# Patient Record
Sex: FEMALE | Race: WHITE | NOT HISPANIC OR LATINO | Employment: OTHER | URBAN - METROPOLITAN AREA
[De-identification: names, ages, dates, MRNs, and addresses within clinical notes are randomized per-mention and may not be internally consistent; named-entity substitution may affect disease eponyms.]

---

## 2017-08-18 ENCOUNTER — TRANSFERRED RECORDS (OUTPATIENT)
Dept: HEALTH INFORMATION MANAGEMENT | Facility: CLINIC | Age: 72
End: 2017-08-18

## 2018-08-02 ENCOUNTER — RADIANT APPOINTMENT (OUTPATIENT)
Dept: MAMMOGRAPHY | Facility: CLINIC | Age: 73
End: 2018-08-02
Attending: OBSTETRICS & GYNECOLOGY
Payer: COMMERCIAL

## 2018-08-02 ENCOUNTER — OFFICE VISIT (OUTPATIENT)
Dept: OBGYN | Facility: CLINIC | Age: 73
End: 2018-08-02
Attending: OBSTETRICS & GYNECOLOGY
Payer: COMMERCIAL

## 2018-08-02 VITALS
SYSTOLIC BLOOD PRESSURE: 124 MMHG | HEIGHT: 65 IN | DIASTOLIC BLOOD PRESSURE: 62 MMHG | BODY MASS INDEX: 24.32 KG/M2 | WEIGHT: 146 LBS

## 2018-08-02 DIAGNOSIS — Z13.220 ENCOUNTER FOR LIPID SCREENING FOR CARDIOVASCULAR DISEASE: ICD-10-CM

## 2018-08-02 DIAGNOSIS — Z13.820 SCREENING FOR OSTEOPOROSIS: ICD-10-CM

## 2018-08-02 DIAGNOSIS — Z01.419 ENCOUNTER FOR GYNECOLOGICAL EXAMINATION WITHOUT ABNORMAL FINDING: Primary | ICD-10-CM

## 2018-08-02 DIAGNOSIS — Z12.31 VISIT FOR SCREENING MAMMOGRAM: ICD-10-CM

## 2018-08-02 DIAGNOSIS — Z13.6 ENCOUNTER FOR LIPID SCREENING FOR CARDIOVASCULAR DISEASE: ICD-10-CM

## 2018-08-02 DIAGNOSIS — Z13.1 SCREENING FOR DIABETES MELLITUS: ICD-10-CM

## 2018-08-02 LAB
CHOLEST SERPL-MCNC: 250 MG/DL
GLUCOSE BLD-MCNC: 95 MG/DL (ref 70–99)
HDLC SERPL-MCNC: 76 MG/DL
LDLC SERPL CALC-MCNC: 155 MG/DL
NONHDLC SERPL-MCNC: 174 MG/DL
TRIGL SERPL-MCNC: 94 MG/DL

## 2018-08-02 PROCEDURE — 36415 COLL VENOUS BLD VENIPUNCTURE: CPT | Performed by: OBSTETRICS & GYNECOLOGY

## 2018-08-02 PROCEDURE — 99397 PER PM REEVAL EST PAT 65+ YR: CPT | Performed by: OBSTETRICS & GYNECOLOGY

## 2018-08-02 PROCEDURE — 77063 BREAST TOMOSYNTHESIS BI: CPT | Mod: TC

## 2018-08-02 PROCEDURE — 77067 SCR MAMMO BI INCL CAD: CPT | Mod: TC

## 2018-08-02 PROCEDURE — 82947 ASSAY GLUCOSE BLOOD QUANT: CPT | Performed by: OBSTETRICS & GYNECOLOGY

## 2018-08-02 PROCEDURE — 80061 LIPID PANEL: CPT | Performed by: OBSTETRICS & GYNECOLOGY

## 2018-08-02 ASSESSMENT — ANXIETY QUESTIONNAIRES
3. WORRYING TOO MUCH ABOUT DIFFERENT THINGS: NOT AT ALL
2. NOT BEING ABLE TO STOP OR CONTROL WORRYING: NOT AT ALL
6. BECOMING EASILY ANNOYED OR IRRITABLE: NOT AT ALL
GAD7 TOTAL SCORE: 0
5. BEING SO RESTLESS THAT IT IS HARD TO SIT STILL: NOT AT ALL
1. FEELING NERVOUS, ANXIOUS, OR ON EDGE: NOT AT ALL
IF YOU CHECKED OFF ANY PROBLEMS ON THIS QUESTIONNAIRE, HOW DIFFICULT HAVE THESE PROBLEMS MADE IT FOR YOU TO DO YOUR WORK, TAKE CARE OF THINGS AT HOME, OR GET ALONG WITH OTHER PEOPLE: NOT DIFFICULT AT ALL
7. FEELING AFRAID AS IF SOMETHING AWFUL MIGHT HAPPEN: NOT AT ALL

## 2018-08-02 ASSESSMENT — PATIENT HEALTH QUESTIONNAIRE - PHQ9: 5. POOR APPETITE OR OVEREATING: NOT AT ALL

## 2018-08-02 NOTE — MR AVS SNAPSHOT
After Visit Summary   8/2/2018    Mima Barrett    MRN: [id number]           Patient Information     Date Of Birth          1945        Visit Information        Provider Department      8/2/2018 9:00 AM Kirstin Godoy DO [facility]        Today's Diagnoses     Encounter for gynecological examination without abnormal finding    -  1    Encounter for lipid screening for cardiovascular disease        Screening for diabetes mellitus        Screening for osteoporosis           Follow-ups after your visit        Follow-up notes from your care team     Return in about 1 year (around 8/2/2019).      Future tests that were ordered for you today     Open Future Orders        Priority Expected Expires Ordered    DX Hip/Pelvis/Spine Routine  8/2/2019 8/2/2018            Who to contact     If you have questions or need follow up information about today's clinic visit or your schedule please contact [facility] directly at [phone].  Normal or non-critical lab and imaging results will be communicated to you by MyChart, letter or phone within 4 business days after the clinic has received the results. If you do not hear from us within 7 days, please contact the clinic through [employer] or phone. If you have a critical or abnormal lab result, we will notify you by phone as soon as possible.  Submit refill requests through Segment or call your pharmacy and they will forward the refill request to us. Please allow 3 business days for your refill to be completed.          Additional Information About Your Visit        MyChart Information     Segment gives you secure access to your electronic health record. If you see a primary care provider, you can also send messages to your care team and make appointments. If you have questions, please call your primary care clinic.  If you do not have a primary care provider, please call [phone] and they will assist  "you.        Care EveryWhere ID     This is your Care EveryWhere ID. This could be used by other organizations to access your [place] medical records  [id number]        Your Vitals Were     Height Breastfeeding? BMI (Body Mass Index)             5' 4.75\" (1.645 m) No 24.48 kg/m2          Blood Pressure from Last 3 Encounters:   08/02/18 124/62   12/01/16 120/72   08/29/16 96/60    Weight from Last 3 Encounters:   08/02/18 146 lb (66.2 kg)   12/01/16 142 lb (64.4 kg)   08/29/16 149 lb 12.8 oz (67.9 kg)              We Performed the Following     Glucose, whole blood     Lipid panel reflex to direct LDL Fasting        Primary Care Provider Office Phone # Fax #    Damián Fowler [phone]       [facility] [address]  [facility] [address]        Equal Access to Services     [facility] : Hadii aad ku hadasho Soomaali, waaxda luqadaha, qaybta kaalmada adeegyada, waxay johnathanin haygersonn rock otoolen . So [facility] [phone].    ATENCIÓN: Si habla español, tiene a lopez disposición servicios gratuitos de asistencia lingüística. Mer al [phone].    We comply with applicable federal civil rights laws and [place] laws. We do not discriminate on the basis of race, color, national origin, age, disability, sex, sexual orientation, or gender identity.            Thank you!     Thank you for choosing [facility]  for your care. Our goal is always to provide you with excellent care. Hearing back from our patients is one way we can continue to improve our services. Please take a few minutes to complete the written survey that you may receive in the mail after your visit with us. Thank you!             Your Updated Medication List - Protect others around you: Learn how to safely use, store and throw away your medicines at www.disposemymeds.org.          This list is accurate as of 8/2/18  9:36 AM.  Always use your most recent med list.                   Brand Name Dispense " Instructions for use Diagnosis    ASPIRIN ADULT LOW STRENGTH PO      Take 81 mg by mouth daily.        calcium-vitamin D 600-400 MG-UNIT per tablet    CALTRATE     Take 1 tablet by mouth daily. Takes 2 tablets in am        GLUCOSAMINE SULFATE PO           MULTIVITAMIN & MINERAL PO      Take 1 tablet by mouth daily.

## 2018-08-02 NOTE — PROGRESS NOTES
Mima is a 73 year old  female who presents for annual exam.     Besides routine health maintenance, she has no other health concerns today .    Do you have a Health Care Directive?: Yes: Patient states has Advance Directive and will bring in a copy to clinic.    Fall risk:        HPI:  The patient's PCP is Damián Fowler MD.    Completed mammo. Last .     Recently had shingles.     Fasting for labs this year. Cholesterol last checked with GP in the winter.   Was on simvistatin until Dec 2017.     No vb/discharge.     Was having issues with her right hip. Then started taking glucosamine and this helped.   Has been doing exercises.   Last dexa .     GYNECOLOGIC HISTORY:  No LMP recorded. Patient is postmenopausal..   reports that she has never smoked. She has never used smokeless tobacco.    Patient is sexually active.  STD testing offered?  Declined  Last PHQ-9 score on record=   PHQ-9 SCORE 2018   Total Score 1     Last GAD7 score on record=   DAVID-7 SCORE 2016   Total Score 0 0     Alcohol Score = 4    HEALTH MAINTENANCE:  Cholesterol:   Cholesterol   Date Value Ref Range Status   2016 227 (H) <200 mg/dL Final     Comment:     Desirable:       <200 mg/dl   2015 189 125 - 200 mg/dL Final    16   Total= 227, Triglycerides=80, HDL=71, NPE=328, FBS=98, TSH=1.79  Last Mammo: 2016, Result: normal, Next Mammo: today   Pap: (No results found for: PAP )  DEXA:  16  Colonoscopy:  14, Result:  normal, Next Colonoscopy: NA.    Health maintenance updated:  yes    HISTORY:  Obstetric History       T2      L2     SAB0   TAB0   Ectopic0   Multiple0   Live Births2       # Outcome Date GA Lbr Juan Pablo/2nd Weight Sex Delivery Anes PTL Lv   2 Term         MANOHAR   1 Term         MANOHAR        Patient Active Problem List   Diagnosis     Hyperlipidemia LDL goal <130     Past Surgical History:   Procedure Laterality Date     cryotherapy      No AIDAN 2/3 since      "ENDOMETRIAL SAMPLING (BIOPSY)  4/97;11/98;4/00    '97-Prolif endo;'98-prolif endo;4/00-prolif endo     OTHER SURGICAL HISTORY  1977    REMOVAL NODULES ON VOCAL CORDS      Social History   Substance Use Topics     Smoking status: Never Smoker     Smokeless tobacco: Never Used     Alcohol use 0.0 oz/week     0 Standard drinks or equivalent per week      Comment: 3 times/wk      Problem (# of Occurrences) Relation (Name,Age of Onset)    Colon Polyps (1) Father    Fractures (1) Mother (83): hip; had previously had radiation therapy for uterine cancer    Osteoperosis (2) Mother, Maternal Grandmother    Uterine Cancer (1) Mother            Current Outpatient Prescriptions   Medication Sig     ASPIRIN ADULT LOW STRENGTH PO Take 81 mg by mouth daily.     calcium-vitamin D (CALTRATE) 600-400 MG-UNIT per tablet Take 1 tablet by mouth daily. Takes 2 tablets in am      GLUCOSAMINE SULFATE PO      Multiple Vitamins-Minerals (MULTIVITAMIN & MINERAL PO) Take 1 tablet by mouth daily.     No current facility-administered medications for this visit.        No Known Allergies    Past medical, surgical, social and family history were reviewed and updated in EPIC.    ROS:   Genitourinary: Urgency  Musculoskeletal: Joint Pain  Endocrine: Decreased Libido    EXAM:  /62  Ht 5' 4.75\" (1.645 m)  Wt 146 lb (66.2 kg)  Breastfeeding? No  BMI 24.48 kg/m2   BMI: Body mass index is 24.48 kg/(m^2).    EXAM:  Constitutional: Appearance: Well nourished, well developed alert, in no acute distress  Neck:  Lymph Nodes:  No lymphadenopathy present    Thyroid:  Gland size normal, nontender, no nodules or masses present  on palpation  Chest:  Respiratory Effort:  Breathing unlabored  Cardiovascular:Heart    Auscultation:  Regular rate, normal rhythm, no murmurs present  Breasts: Inspection of Breasts:  No lymphadenopathy present., Palpation of Breasts and Axillae:  No masses present on palpation, no breast tenderness., Axillary Lymph Nodes:  No " lymphadenopathy present. and No nodularity, asymmetry or nipple discharge bilaterally.  Gastrointestinal:  Abdominal Examination:  Abdomen nontender to palpation, tone normal without     rigidity or guarding, no masses present, umbilicus without lesions    Liver and speen:  No hepatomegaly present, liver nontender to palpation    Hernias:  No hernias present  Lymphatic: Lymph Nodes:  No other lymphadenopathy present  Skin:  General Inspection:  No rashes present, no lesions present, no areas of  discoloration.    Genitalia and Groin:  No rashes present, no lesions present, no areas of  discoloration, no masses present  Neurologic/Psychiatric:    Mental Status:  Oriented X3     Pelvic Exam:  External Genitalia:     Normal appearance for age, no discharge present, no tenderness present, no inflammatory lesions present, color normal  Vagina:     Normal vaginal vault without central or paravaginal defects, no discharge present, no inflammatory lesions present, no masses present  Bladder:     Nontender to palpation  Urethra:   Urethral Body:  Urethra palpation normal, urethra structural support normal   Urethral Meatus:  No erythema or lesions present  Cervix:     Appearance healthy, no lesions present, nontender to palpation, no bleeding present  Uterus:     Uterus: firm, normal sized and nontender, anteverted in position.   Adnexa:     No adnexal tenderness present, no adnexal masses present  Perineum:     Perineum within normal limits, no evidence of trauma, no rashes or skin lesions present  Anus:     Anus within normal limits, no hemorrhoids present  Inguinal Lymph Nodes:     No lymphadenopathy present  Pubic Hair:     Normal pubic hair distribution for age  Genitalia and Groin:     No rashes present, no lesions present, no areas of discoloration, no masses present      COUNSELING:   Reviewed preventive health counseling, as reflected in patient instructions       Osteoporosis Prevention/Bone Health    BMI:  Body  mass index is 24.48 kg/(m^2).     reports that she has never smoked. She has never used smokeless tobacco.      ASSESSMENT:  73 year old female with satisfactory annual exam.    ICD-10-CM    1. Encounter for gynecological examination without abnormal finding Z01.419    2. Encounter for lipid screening for cardiovascular disease Z13.220 Lipid panel reflex to direct LDL Fasting    Z13.6    3. Screening for diabetes mellitus Z13.1 Glucose, whole blood   4. Screening for osteoporosis Z13.820 DX Hip/Pelvis/Spine       PLAN:  -UTD for cervical cancer screening. No further paps needed  -Breast self awareness discussed. UTD for mammogram.  -Discussed exercise-making plan, strength training. Nutrition encouraged.  -Colonoscopy due next year  -Osteoporosis prevention discussed. Due for dexa  -Desires fasting labs. If elevated cholesterol this year, will have her f/u with PCP, may need to resume therapy  -PMB precautions  -Return one year for next annual exam      Kirstin Bower Masters, DO

## 2018-08-03 ASSESSMENT — PATIENT HEALTH QUESTIONNAIRE - PHQ9: SUM OF ALL RESPONSES TO PHQ QUESTIONS 1-9: 1

## 2018-08-03 ASSESSMENT — ANXIETY QUESTIONNAIRES: GAD7 TOTAL SCORE: 0

## 2018-08-07 ENCOUNTER — RADIANT APPOINTMENT (OUTPATIENT)
Dept: BONE DENSITY | Facility: CLINIC | Age: 73
End: 2018-08-07
Attending: OBSTETRICS & GYNECOLOGY
Payer: COMMERCIAL

## 2018-08-07 DIAGNOSIS — Z13.820 SCREENING FOR OSTEOPOROSIS: ICD-10-CM

## 2018-08-07 PROCEDURE — 77080 DXA BONE DENSITY AXIAL: CPT | Performed by: OBSTETRICS & GYNECOLOGY

## 2018-09-06 ENCOUNTER — OFFICE VISIT (OUTPATIENT)
Dept: OBGYN | Facility: CLINIC | Age: 73
End: 2018-09-06
Payer: COMMERCIAL

## 2018-09-06 VITALS — DIASTOLIC BLOOD PRESSURE: 64 MMHG | WEIGHT: 151 LBS | SYSTOLIC BLOOD PRESSURE: 106 MMHG | BODY MASS INDEX: 25.32 KG/M2

## 2018-09-06 DIAGNOSIS — M81.0 OSTEOPOROSIS, SENILE: Primary | ICD-10-CM

## 2018-09-06 PROCEDURE — 36415 COLL VENOUS BLD VENIPUNCTURE: CPT | Performed by: OBSTETRICS & GYNECOLOGY

## 2018-09-06 PROCEDURE — 80048 BASIC METABOLIC PNL TOTAL CA: CPT | Performed by: OBSTETRICS & GYNECOLOGY

## 2018-09-06 PROCEDURE — 99214 OFFICE O/P EST MOD 30 MIN: CPT | Performed by: OBSTETRICS & GYNECOLOGY

## 2018-09-06 PROCEDURE — 82306 VITAMIN D 25 HYDROXY: CPT | Performed by: OBSTETRICS & GYNECOLOGY

## 2018-09-06 RX ORDER — ALENDRONATE SODIUM 70 MG/1
TABLET ORAL
Qty: 12 TABLET | Refills: 3 | Status: SHIPPED | OUTPATIENT
Start: 2018-09-06 | End: 2019-08-08

## 2018-09-06 NOTE — PROGRESS NOTES
SUBJECTIVE:                                                   Mima Barrett is a 73 year old female who presents to clinic today for the following health issue(s):  Patient presents with:  Consult: Dexa Results      HPI:  Is really interested in starting a medicine. Does not want to get any fractures. Saw her mom die in surgery after breaking her hip. Her grandmother had a bad outcome as well from osteoporosis.    Walks 30m daily. Takes 2 calcium tabs per day. Eats cheese daily. [place] milk daily. Protein shake daily that has ca.     No hx GERD.     Years ago tried a med maybe and didn't feel well on it.     Review of PMH, SocHx, SurHx, FHx, medications completed. Epic updated.    No LMP recorded. Patient is postmenopausal..   Patient is sexually active, .  Using menopause for contraception.    reports that she has never smoked. She has never used smokeless tobacco.    STD testing offered?  Declined    Health maintenance updated:  yes    Today's PHQ-2 Score:   PHQ-2 (  [employer]) 2016   Q1: Little interest or pleasure in doing things 0   Q2: Feeling down, depressed or hopeless 0   PHQ-2 Score 0     Today's PHQ-9 Score:   PHQ-9 SCORE 2018   Total Score 1     Today's DAVID-7 Score:   DAVID-7 SCORE 2018   Total Score 0       Problem list and histories reviewed & adjusted, as indicated.  Additional history: as documented.    Patient Active Problem List   Diagnosis     Hyperlipidemia LDL goal <130     Past Surgical History:   Procedure Laterality Date     cryotherapy      No AIDAN 2/3 since     ENDOMETRIAL SAMPLING (BIOPSY)  ;;-Prolif endo;-prolif endo;-prolif endo     OTHER SURGICAL HISTORY      REMOVAL NODULES ON VOCAL CORDS      Social History   Substance Use Topics     Smoking status: Never Smoker     Smokeless tobacco: Never Used     Alcohol use 0.0 oz/week     0 Standard drinks or equivalent per week      Comment: 3 times/wk      Problem (# of Occurrences)  Relation (Name,Age of Onset)    Colon Polyps (1) Father    Fractures (1) Mother (83): hip; had previously had radiation therapy for uterine cancer    Osteoporosis (2) Mother, Maternal Grandmother    Uterine Cancer (1) Mother            Current Outpatient Prescriptions   Medication Sig     alendronate (FOSAMAX) 70 MG tablet Take 1 tablet (70 mg) by mouth with 8oz water every 7 days 30 minutes before breakfast and remain upright during this time.     ASPIRIN ADULT LOW STRENGTH PO Take 81 mg by mouth daily.     calcium-vitamin D (CALTRATE) 600-400 MG-UNIT per tablet Take 1 tablet by mouth daily. Takes 2 tablets in am      GLUCOSAMINE SULFATE PO      Multiple Vitamins-Minerals (MULTIVITAMIN & MINERAL PO) Take 1 tablet by mouth daily.     No current facility-administered medications for this visit.      No Known Allergies    ROS:  12 point review of systems negative other than symptoms noted below.    OBJECTIVE:     /64  Wt 151 lb (68.5 kg)  Breastfeeding? No  BMI 25.32 kg/m2  Body mass index is 25.32 kg/(m^2).    Exam:  Constitutional:  Appearance: Well nourished, well developed alert, in no acute distress  Chest:  Respiratory Effort:  Breathing unlabored  Neurologic/Psychiatric:  Mental Status:  Oriented X3        ASSESSMENT/PLAN:                                                        ICD-10-CM    1. Osteoporosis, senile M81.0 Basic metabolic panel     Vitamin D Deficiency     alendronate (FOSAMAX) 70 MG tablet       Patient Instructions     Alendronate weekly tablets  Brand Name: Fosamax  What is this medicine?  ALENDRONATE (a TIM droe louis) slows calcium loss from bones. It helps to make healthy bone and to slow bone loss in people with osteoporosis. It may be used to treat Paget's disease.  How should I use this medicine?  You must take this medicine exactly as directed or you will lower the amount of medicine you absorb into your body or you may cause yourself harm. Take your dose by mouth first thing in  the morning, after you are up for the day. Do not eat or drink anything before you take this medicine. Swallow your medicine with a full glass (6 to 8 fluid ounces) of plain water. Do not take this tablet with any other drink. Do not chew or crush the tablet. After taking this medicine, do not eat breakfast, drink, or take any medicines or vitamins for at least 30 minutes. Stand or sit up for at least 30 minutes after you take this medicine; do not lie down. Take this medicine on the same day every week. Do not take your medicine more often than directed.  Talk to your pediatrician regarding the use of this medicine in children. Special care may be needed.  What side effects may I notice from receiving this medicine?  Side effects that you should report to your doctor or health care professional as soon as possible:    allergic reactions like skin rash, itching or hives, swelling of the face, lips, or tongue    black or tarry stools    bone, muscle or joint pain    changes in vision    chest pain    heartburn or stomach pain    jaw pain, especially after dental work    pain or trouble when swallowing    redness, blistering, peeling or loosening of the skin, including inside the mouth  Side effects that usually do not require medical attention (report to your doctor or health care professional if they continue or are bothersome):    changes in taste    diarrhea or constipation    eye pain or itching    headache    nausea or vomiting    stomach gas or fullness  What may interact with this medicine?    aluminum hydroxide    antacids    aspirin    calcium supplements    drugs for inflammation like ibuprofen, naproxen, and others    iron supplements    magnesium supplements    vitamins with minerals    What if I miss a dose?  If you miss a dose, take the dose on the morning after you remember. Then take your next dose on your regular day of the week. Never take 2 tablets on the same day. Do not take double or extra  doses.  Where should I keep my medicine?  Keep out of the reach of children.  Store at room temperature of 15 and 30 degrees C (59 and 86 degrees F). Throw away any unused medicine after the expiration date.  What should I tell my health care provider before I take this medicine?  They need to know if you have any of these conditions:    esophagus, stomach, or intestine problems, like acid-reflux or GERD    dental disease    kidney disease    low blood calcium    low vitamin D    problems swallowing    problems sitting or standing for 30 minutes    an unusual or allergic reaction to alendronate, other medicines, foods, dyes, or preservatives    pregnant or trying to get pregnant    breast-feeding  What should I watch for while using this medicine?  Visit your doctor or health care professional for regular checks ups. It may be some time before you see benefit from this medicine. Do not stop taking your medication except on your doctor's advice. Your doctor or health care professional may order blood tests and other tests to see how you are doing.  You should make sure you get enough calcium and vitamin D while you are taking this medicine, unless your doctor tells you not to. Discuss the foods you eat and the vitamins you take with your health care professional.  Some people who take this medicine have severe bone, joint, and/or muscle pain. This medicine may also increase your risk for a broken thigh bone. Tell your doctor right away if you have pain in your upper leg or groin. Tell your doctor if you have any pain that does not go away or that gets worse.  This medicine can make you more sensitive to the sun. If you get a rash while taking this medicine, sunlight may cause the rash to get worse. Keep out of the sun. If you cannot avoid being in the sun, wear protective clothing and use sunscreen. Do not use sun lamps or tanning beds/booths.  NOTE:This sheet is a summary. It may not cover all possible information.  If you have questions about this medicine, talk to your doctor, pharmacist, or health care provider. Copyright  2018 [employer]            -Discussed how bone density measured and then risk for fracture determined, when to decide upon need for meds and Frax scores. She would qualify for tx based on frax. Pt is definitely in agreement. Discussed the morbidity and mortality of hip fractures and other fractures in elderly.   Reviewed her hx, no contraindications for bisphosphonates. Discussed risks and benefits, possible SE and when to call back/return if any issues. Discussed monitoring therapy with Dexa in 2yr. Discussed how to take meds and stay up right.   Check vit D and ca prior to initiating therapy.  Questions answered. Pt happy with plan.     (25 minutes was spent face to face with the patient today discussing her history, diagnosis, and follow-up plan as noted above. Over 50% of the visit was spent in counseling and coordination of care.)      Kirstin Bower Masters, [facility] FOR [facility]

## 2018-09-06 NOTE — PATIENT INSTRUCTIONS
Alendronate weekly tablets  Brand Name: Fosamax  What is this medicine?  ALENDRONATE (a TIM droe louis) slows calcium loss from bones. It helps to make healthy bone and to slow bone loss in people with osteoporosis. It may be used to treat Paget's disease.  How should I use this medicine?  You must take this medicine exactly as directed or you will lower the amount of medicine you absorb into your body or you may cause yourself harm. Take your dose by mouth first thing in the morning, after you are up for the day. Do not eat or drink anything before you take this medicine. Swallow your medicine with a full glass (6 to 8 fluid ounces) of plain water. Do not take this tablet with any other drink. Do not chew or crush the tablet. After taking this medicine, do not eat breakfast, drink, or take any medicines or vitamins for at least 30 minutes. Stand or sit up for at least 30 minutes after you take this medicine; do not lie down. Take this medicine on the same day every week. Do not take your medicine more often than directed.  Talk to your pediatrician regarding the use of this medicine in children. Special care may be needed.  What side effects may I notice from receiving this medicine?  Side effects that you should report to your doctor or health care professional as soon as possible:    allergic reactions like skin rash, itching or hives, swelling of the face, lips, or tongue    black or tarry stools    bone, muscle or joint pain    changes in vision    chest pain    heartburn or stomach pain    jaw pain, especially after dental work    pain or trouble when swallowing    redness, blistering, peeling or loosening of the skin, including inside the mouth  Side effects that usually do not require medical attention (report to your doctor or health care professional if they continue or are bothersome):    changes in taste    diarrhea or constipation    eye pain or itching    headache    nausea or vomiting    stomach gas  or fullness  What may interact with this medicine?    aluminum hydroxide    antacids    aspirin    calcium supplements    drugs for inflammation like ibuprofen, naproxen, and others    iron supplements    magnesium supplements    vitamins with minerals    What if I miss a dose?  If you miss a dose, take the dose on the morning after you remember. Then take your next dose on your regular day of the week. Never take 2 tablets on the same day. Do not take double or extra doses.  Where should I keep my medicine?  Keep out of the reach of children.  Store at room temperature of 15 and 30 degrees C (59 and 86 degrees F). Throw away any unused medicine after the expiration date.  What should I tell my health care provider before I take this medicine?  They need to know if you have any of these conditions:    esophagus, stomach, or intestine problems, like acid-reflux or GERD    dental disease    kidney disease    low blood calcium    low vitamin D    problems swallowing    problems sitting or standing for 30 minutes    an unusual or allergic reaction to alendronate, other medicines, foods, dyes, or preservatives    pregnant or trying to get pregnant    breast-feeding  What should I watch for while using this medicine?  Visit your doctor or health care professional for regular checks ups. It may be some time before you see benefit from this medicine. Do not stop taking your medication except on your doctor's advice. Your doctor or health care professional may order blood tests and other tests to see how you are doing.  You should make sure you get enough calcium and vitamin D while you are taking this medicine, unless your doctor tells you not to. Discuss the foods you eat and the vitamins you take with your health care professional.  Some people who take this medicine have severe bone, joint, and/or muscle pain. This medicine may also increase your risk for a broken thigh bone. Tell your doctor right away if you have pain  in your upper leg or groin. Tell your doctor if you have any pain that does not go away or that gets worse.  This medicine can make you more sensitive to the sun. If you get a rash while taking this medicine, sunlight may cause the rash to get worse. Keep out of the sun. If you cannot avoid being in the sun, wear protective clothing and use sunscreen. Do not use sun lamps or tanning beds/booths.  NOTE:This sheet is a summary. It may not cover all possible information. If you have questions about this medicine, talk to your doctor, pharmacist, or health care provider. Copyright  2018 [employer]

## 2018-09-06 NOTE — MR AVS SNAPSHOT
After Visit Summary   9/6/2018    Mima Barrett    MRN: [id number]           Patient Information     Date Of Birth          1945        Visit Information        Provider Department      9/6/2018 1:50 PM sKirstin DO [facility]        Today's Diagnoses     Osteoporosis, senile    -  1      Care Instructions      Alendronate weekly tablets  Brand Name: Fosamax  What is this medicine?  ALENDRONATE (a TIM droe louis) slows calcium loss from bones. It helps to make healthy bone and to slow bone loss in people with osteoporosis. It may be used to treat Paget's disease.  How should I use this medicine?  You must take this medicine exactly as directed or you will lower the amount of medicine you absorb into your body or you may cause yourself harm. Take your dose by mouth first thing in the morning, after you are up for the day. Do not eat or drink anything before you take this medicine. Swallow your medicine with a full glass (6 to 8 fluid ounces) of plain water. Do not take this tablet with any other drink. Do not chew or crush the tablet. After taking this medicine, do not eat breakfast, drink, or take any medicines or vitamins for at least 30 minutes. Stand or sit up for at least 30 minutes after you take this medicine; do not lie down. Take this medicine on the same day every week. Do not take your medicine more often than directed.  Talk to your pediatrician regarding the use of this medicine in children. Special care may be needed.  What side effects may I notice from receiving this medicine?  Side effects that you should report to your doctor or health care professional as soon as possible:    allergic reactions like skin rash, itching or hives, swelling of the face, lips, or tongue    black or tarry stools    bone, muscle or joint pain    changes in vision    chest pain    heartburn or stomach pain    jaw pain, especially after dental work    pain or trouble  when swallowing    redness, blistering, peeling or loosening of the skin, including inside the mouth  Side effects that usually do not require medical attention (report to your doctor or health care professional if they continue or are bothersome):    changes in taste    diarrhea or constipation    eye pain or itching    headache    nausea or vomiting    stomach gas or fullness  What may interact with this medicine?    aluminum hydroxide    antacids    aspirin    calcium supplements    drugs for inflammation like ibuprofen, naproxen, and others    iron supplements    magnesium supplements    vitamins with minerals    What if I miss a dose?  If you miss a dose, take the dose on the morning after you remember. Then take your next dose on your regular day of the week. Never take 2 tablets on the same day. Do not take double or extra doses.  Where should I keep my medicine?  Keep out of the reach of children.  Store at room temperature of 15 and 30 degrees C (59 and 86 degrees F). Throw away any unused medicine after the expiration date.  What should I tell my health care provider before I take this medicine?  They need to know if you have any of these conditions:    esophagus, stomach, or intestine problems, like acid-reflux or GERD    dental disease    kidney disease    low blood calcium    low vitamin D    problems swallowing    problems sitting or standing for 30 minutes    an unusual or allergic reaction to alendronate, other medicines, foods, dyes, or preservatives    pregnant or trying to get pregnant    breast-feeding  What should I watch for while using this medicine?  Visit your doctor or health care professional for regular checks ups. It may be some time before you see benefit from this medicine. Do not stop taking your medication except on your doctor's advice. Your doctor or health care professional may order blood tests and other tests to see how you are doing.  You should make sure you get enough calcium  and vitamin D while you are taking this medicine, unless your doctor tells you not to. Discuss the foods you eat and the vitamins you take with your health care professional.  Some people who take this medicine have severe bone, joint, and/or muscle pain. This medicine may also increase your risk for a broken thigh bone. Tell your doctor right away if you have pain in your upper leg or groin. Tell your doctor if you have any pain that does not go away or that gets worse.  This medicine can make you more sensitive to the sun. If you get a rash while taking this medicine, sunlight may cause the rash to get worse. Keep out of the sun. If you cannot avoid being in the sun, wear protective clothing and use sunscreen. Do not use sun lamps or tanning beds/booths.  NOTE:This sheet is a summary. It may not cover all possible information. If you have questions about this medicine, talk to your doctor, pharmacist, or health care provider. Copyright  2018 [employer]                Follow-ups after your visit        Who to contact     If you have questions or need follow up information about today's clinic visit or your schedule please contact [facility] directly at [phone].  Normal or non-critical lab and imaging results will be communicated to you by [employer], letter or phone within 4 business days after the clinic has received the results. If you do not hear from us within 7 days, please contact the clinic through [employer] or phone. If you have a critical or abnormal lab result, we will notify you by phone as soon as possible.  Submit refill requests through [employer] or call your pharmacy and they will forward the refill request to us. Please allow 3 business days for your refill to be completed.          Additional Information About Your Visit        [employer] Information     [employer] gives you secure access to your electronic health record. If you see a primary care provider, you can also send messages to  your care team and make appointments. If you have questions, please call your primary care clinic.  If you do not have a primary care provider, please call [phone] and they will assist you.        Care EveryWhere ID     This is your Care EveryWhere ID. This could be used by other organizations to access your [place] medical records  [id number]        Your Vitals Were     Breastfeeding? BMI (Body Mass Index)                No 25.32 kg/m2           Blood Pressure from Last 3 Encounters:   09/06/18 106/64   08/02/18 124/62   12/01/16 120/72    Weight from Last 3 Encounters:   09/06/18 151 lb (68.5 kg)   08/02/18 146 lb (66.2 kg)   12/01/16 142 lb (64.4 kg)              We Performed the Following     Basic metabolic panel     Vitamin D Deficiency          Today's Medication Changes          These changes are accurate as of 9/6/18  2:42 PM.  If you have any questions, ask your nurse or doctor.               Start taking these medicines.        Dose/Directions    alendronate 70 MG tablet   Commonly known as:  FOSAMAX   Used for:  Osteoporosis, senile   Started by:  Kirstin Godoy, DO        Take 1 tablet (70 mg) by mouth with 8oz water every 7 days 30 minutes before breakfast and remain upright during this time.   Quantity:  12 tablet   Refills:  3            Where to get your medicines      These medications were sent to [facility] [address] [place] [address] [address] [facility]  JESSICA  [facility] [address]     Phone:  [phone]     alendronate 70 MG tablet                Primary Care Provider Office Phone # Fax #    Damián Fowler [phone]       [facility] [address]  [facility] [address]        Equal Access to Services     SAGE GOULD AH: Pasha Rowe, darrell del cid, julianna melgar. So [facility] [phone].    ATENCIÓN: Si habla español, tiene a lopez disposición  servicios gratuitos de asistencia lingüística. Mer younger [phone].    We comply with applicable federal civil rights laws and [place] laws. We do not discriminate on the basis of race, color, national origin, age, disability, sex, sexual orientation, or gender identity.            Thank you!     Thank you for choosing [facility]  for your care. Our goal is always to provide you with excellent care. Hearing back from our patients is one way we can continue to improve our services. Please take a few minutes to complete the written survey that you may receive in the mail after your visit with us. Thank you!             Your Updated Medication List - Protect others around you: Learn how to safely use, store and throw away your medicines at www.disposemymeds.org.          This list is accurate as of 9/6/18  2:42 PM.  Always use your most recent med list.                   Brand Name Dispense Instructions for use Diagnosis    alendronate 70 MG tablet    FOSAMAX    12 tablet    Take 1 tablet (70 mg) by mouth with 8oz water every 7 days 30 minutes before breakfast and remain upright during this time.    Osteoporosis, senile       ASPIRIN ADULT LOW STRENGTH PO      Take 81 mg by mouth daily.        calcium carbonate 600 mg-vitamin D 400 units 600-400 MG-UNIT per tablet    CALTRATE     Take 1 tablet by mouth daily. Takes 2 tablets in am        GLUCOSAMINE SULFATE PO           MULTIVITAMIN & MINERAL PO      Take 1 tablet by mouth daily.

## 2018-09-07 LAB
ANION GAP SERPL CALCULATED.3IONS-SCNC: 9 MMOL/L (ref 3–14)
BUN SERPL-MCNC: 15 MG/DL (ref 7–30)
CALCIUM SERPL-MCNC: 9.4 MG/DL (ref 8.5–10.1)
CHLORIDE SERPL-SCNC: 106 MMOL/L (ref 94–109)
CO2 SERPL-SCNC: 28 MMOL/L (ref 20–32)
CREAT SERPL-MCNC: 0.72 MG/DL (ref 0.52–1.04)
DEPRECATED CALCIDIOL+CALCIFEROL SERPL-MC: 40 UG/L (ref 20–75)
GFR SERPL CREATININE-BSD FRML MDRD: 79 ML/MIN/1.7M2
GLUCOSE SERPL-MCNC: 86 MG/DL (ref 70–99)
POTASSIUM SERPL-SCNC: 4.5 MMOL/L (ref 3.4–5.3)
SODIUM SERPL-SCNC: 143 MMOL/L (ref 133–144)

## 2018-11-27 ENCOUNTER — TELEPHONE (OUTPATIENT)
Dept: NURSING | Facility: CLINIC | Age: 73
End: 2018-11-27

## 2018-11-27 NOTE — TELEPHONE ENCOUNTER
Pt calling inquring on possible side effects from the alendronate (Fosamax); started medication in September 2018  Experiencing stomach upset but having more esophageal burning pain that has slowly increased over the last two weeks that is her main complaint. Is similar to indigestion pain.  Has been taking medication in morning 30 min before BF and staying upright every Monday. Avoids Ibuprofen. Cannot take pepto-bismol with this medication.    Requesting Dr. Godoy to advise on medication as she feels like she cannot take anymore with these side effects.    Routing to Dr. Godoy to advise and will call pt back with her response.  Pt verbalized understanding and no further questions.

## 2018-12-07 NOTE — TELEPHONE ENCOUNTER
Spoke with Mima. She had taken the med for 2mo and got pain/refulx. Stopped it for couple weeks. Then last week restarted and did fine with the first one. Has also noticed other foods/drinks which cause reflux and has limited these.   Will continue oral fosamax and see how she does. If reflux/intolerance returns will change to zolendronic acid infusion.   Pt happy with plan.    Kirstin Bower Masters, DO

## 2019-07-31 ENCOUNTER — TRANSFERRED RECORDS (OUTPATIENT)
Dept: HEALTH INFORMATION MANAGEMENT | Facility: CLINIC | Age: 74
End: 2019-07-31

## 2019-08-08 ENCOUNTER — OFFICE VISIT (OUTPATIENT)
Dept: OBGYN | Facility: CLINIC | Age: 74
End: 2019-08-08
Payer: COMMERCIAL

## 2019-08-08 ENCOUNTER — ANCILLARY PROCEDURE (OUTPATIENT)
Dept: MAMMOGRAPHY | Facility: CLINIC | Age: 74
End: 2019-08-08
Payer: COMMERCIAL

## 2019-08-08 VITALS
HEIGHT: 65 IN | BODY MASS INDEX: 23.16 KG/M2 | WEIGHT: 139 LBS | SYSTOLIC BLOOD PRESSURE: 92 MMHG | DIASTOLIC BLOOD PRESSURE: 58 MMHG | HEART RATE: 64 BPM

## 2019-08-08 DIAGNOSIS — M81.0 OSTEOPOROSIS, SENILE: ICD-10-CM

## 2019-08-08 DIAGNOSIS — Z01.419 ENCOUNTER FOR GYNECOLOGICAL EXAMINATION WITHOUT ABNORMAL FINDING: Primary | ICD-10-CM

## 2019-08-08 DIAGNOSIS — Z12.31 VISIT FOR SCREENING MAMMOGRAM: ICD-10-CM

## 2019-08-08 PROCEDURE — 77067 SCR MAMMO BI INCL CAD: CPT | Mod: TC

## 2019-08-08 PROCEDURE — 77063 BREAST TOMOSYNTHESIS BI: CPT | Mod: TC

## 2019-08-08 PROCEDURE — 99397 PER PM REEVAL EST PAT 65+ YR: CPT | Performed by: OBSTETRICS & GYNECOLOGY

## 2019-08-08 RX ORDER — ALENDRONATE SODIUM 70 MG/1
TABLET ORAL
Qty: 12 TABLET | Refills: 3 | Status: SHIPPED | OUTPATIENT
Start: 2019-08-08 | End: 2020-05-04

## 2019-08-08 ASSESSMENT — ANXIETY QUESTIONNAIRES
7. FEELING AFRAID AS IF SOMETHING AWFUL MIGHT HAPPEN: NOT AT ALL
GAD7 TOTAL SCORE: 0
5. BEING SO RESTLESS THAT IT IS HARD TO SIT STILL: NOT AT ALL
1. FEELING NERVOUS, ANXIOUS, OR ON EDGE: NOT AT ALL
3. WORRYING TOO MUCH ABOUT DIFFERENT THINGS: NOT AT ALL
2. NOT BEING ABLE TO STOP OR CONTROL WORRYING: NOT AT ALL
6. BECOMING EASILY ANNOYED OR IRRITABLE: NOT AT ALL

## 2019-08-08 ASSESSMENT — PATIENT HEALTH QUESTIONNAIRE - PHQ9
5. POOR APPETITE OR OVEREATING: NOT AT ALL
SUM OF ALL RESPONSES TO PHQ QUESTIONS 1-9: 0

## 2019-08-08 ASSESSMENT — MIFFLIN-ST. JEOR: SCORE: 1141.38

## 2019-08-08 NOTE — PROGRESS NOTES
Mima is a 74 year old  female who presents for annual exam.     Besides routine health maintenance,  she would like to discuss bladder control, and muscle cramps.    Do you have a Health Care Directive?: Yes: Patient states has Advance Directive and will bring in a copy to clinic.    Fall risk:   Fallen 2 or more times in the past year?: No  Any fall with injury in the past year?: No    HPI:  The patient's PCP is  Damián Fowler MD.  Had annual recently. Had labs done.     Found out in  had bursitis, had cortisone shots and PT and has really helped.     Had lost wt.     Colonoscopy last week. Doing them q 5yrs. Polyps found, next in 3yrs.     Tolerating the fosamax.     Had traveled to [place] in spring.     No VB.         GYNECOLOGIC HISTORY:  No LMP recorded. Patient is postmenopausal..   reports that she has never smoked. She has never used smokeless tobacco.    Patient is sexually active.  STD testing offered?  Declined  Last PHQ-9 score on record=   PHQ-9 SCORE 2019   PHQ-9 Total Score 0     Last GAD7 score on record=   DAVID-7 SCORE 2016   Total Score 0 0 0     Alcohol Score = 3    HEALTH MAINTENANCE:  Cholesterol: (  Cholesterol   Date Value Ref Range Status   2018 250 (H) <200 mg/dL Final     Comment:     Desirable:       <200 mg/dl   2016 227 (H) <200 mg/dL Final     Comment:     Desirable:       <200 mg/dl      Last Mammo: one year ago, Result: normal, Next Mammo: today   Pap:   DEXA:  18  Colonoscopy:  19, Result:  Polyp, Next Colonoscopy: 3 years.    Health maintenance updated:  yes    HISTORY:  OB History    Para Term  AB Living   2 2 2 0 0 2   SAB TAB Ectopic Multiple Live Births   0 0 0 0 2      # Outcome Date GA Lbr Juan Pablo/2nd Weight Sex Delivery Anes PTL Lv   2 Term         MANOHAR   1 Term         MANOHAR     Patient Active Problem List   Diagnosis     Hyperlipidemia LDL goal <130     Past Surgical History:   Procedure  "Laterality Date     cryotherapy  1978    No AIDAN 2/3 since     ENDOMETRIAL SAMPLING (BIOPSY)  4/97;11/98;4/00 '97-Prolif endo;'98-prolif endo;4/00-prolif endo     OTHER SURGICAL HISTORY  1977    REMOVAL NODULES ON VOCAL CORDS      Social History     Tobacco Use     Smoking status: Never Smoker     Smokeless tobacco: Never Used   Substance Use Topics     Alcohol use: Yes     Alcohol/week: 0.0 oz     Comment: 3 times/wk      Problem (# of Occurrences) Relation (Name,Age of Onset)    Colon Polyps (1) Father    Fractures (1) Mother (83): hip; had previously had radiation therapy for uterine cancer    Osteoporosis (2) Mother, Maternal Grandmother    Uterine Cancer (1) Mother            Current Outpatient Medications   Medication Sig     alendronate (FOSAMAX) 70 MG tablet Take 1 tablet (70 mg) by mouth with 8oz water every 7 days 30 minutes before breakfast and remain upright during this time.     calcium-vitamin D (CALTRATE) 600-400 MG-UNIT per tablet Take 1 tablet by mouth daily. Takes 2 tablets in am      Multiple Vitamins-Minerals (MULTIVITAMIN & MINERAL PO) Take 1 tablet by mouth daily.     No current facility-administered medications for this visit.        No Known Allergies    Past medical, surgical, social and family history were reviewed and updated in EPIC.    ROS:   12 point review of systems negative other than symptoms noted below.  Musculoskeletal: Muscle Cramps    EXAM:  BP 92/58   Pulse 64   Ht 1.651 m (5' 5\")   Wt 63 kg (139 lb)   BMI 23.13 kg/m     BMI: Body mass index is 23.13 kg/m .    EXAM:  Constitutional: Appearance: Well nourished, well developed alert, in no acute distress  Neck:  Lymph Nodes:  No lymphadenopathy present    Thyroid:  Gland size normal, nontender, no nodules or masses present  on palpation  Chest:  Respiratory Effort:  Breathing unlabored  Cardiovascular:Heart    Auscultation:  Regular rate, normal rhythm, no murmurs present  Breasts: Inspection of Breasts:  No " lymphadenopathy present., Palpation of Breasts and Axillae:  No masses present on palpation, no breast tenderness., Axillary Lymph Nodes:  No lymphadenopathy present. and No nodularity, asymmetry or nipple discharge bilaterally.  Gastrointestinal:  Abdominal Examination:  Abdomen nontender to palpation, tone normal without     rigidity or guarding, no masses present, umbilicus without lesions    Liver and speen:  No hepatomegaly present, liver nontender to palpation    Hernias:  No hernias present  Lymphatic: Lymph Nodes:  No other lymphadenopathy present  Skin:  General Inspection:  No rashes present, no lesions present, no areas of  discoloration.    Genitalia and Groin:  No rashes present, no lesions present, no areas of  discoloration, no masses present  Neurologic/Psychiatric:    Mental Status:  Oriented X3     Pelvic Exam:  External Genitalia:     Normal appearance for age, no discharge present, no tenderness present, no inflammatory lesions present, color normal  Vagina:     Normal vaginal vault without central or paravaginal defects, no discharge present, no inflammatory lesions present, no masses present  Bladder:     Nontender to palpation  Urethra:   Urethral Body:  Urethra palpation normal, urethra structural support normal   Urethral Meatus:  No erythema or lesions present  Cervix:     Appearance healthy, no lesions present, nontender to palpation, no bleeding present  Uterus:     Uterus: firm, normal sized and nontender, anteverted in position.   Adnexa:     No adnexal tenderness present, no adnexal masses present  Perineum:     Perineum within normal limits, no evidence of trauma, no rashes or skin lesions present  Anus:     Anus within normal limits, no hemorrhoids present  Inguinal Lymph Nodes:     No lymphadenopathy present  Pubic Hair:     Normal pubic hair distribution for age  Genitalia and Groin:     No rashes present, no lesions present, no areas of discoloration, no masses  present      COUNSELING:   Reviewed preventive health counseling, as reflected in patient instructions       Osteoporosis Prevention/Bone Health    BMI:  Body mass index is 23.13 kg/m .     reports that she has never smoked. She has never used smokeless tobacco.      ASSESSMENT:  74 year old female with satisfactory annual exam.    ICD-10-CM    1. Encounter for gynecological examination without abnormal finding Z01.419    2. Osteoporosis, senile M81.0 alendronate (FOSAMAX) 70 MG tablet       PLAN:  -UTD for cervical cancer screening. No further tests needed.  -Breast self awareness discussed. N for mammogram.  -Discussed exercise-making plan, strength training. Nutrition encouraged.  -Colonoscopy UTD  -Osteoporosis prevention discussed. Refill fosamax. Tolerating well. Dexa next year  -PCP manages labs  -PMB precautions  -Return one year for next annual exam          Kirstin Bower Masters, DO

## 2019-08-08 NOTE — PATIENT INSTRUCTIONS
Start metamucil twice daily. Other option is citrucel.     Patient Education     Kegel Exercises  What are Kegel exercises?  Kegels are exercises that tighten and release the pelvic muscles. These muscles wrap around both the anus and urethra (the tube that carries urine out of the body).  To find these muscles, try to stop and start the flow of urine while using the toilet.  Kegel exercises may:    Give you greater bladder control (stop or prevent urine from leaking).    Give you greater bowel control.    Increase pleasure during sex.    Ease childbirth for pregnant women.    Help men regain bladder control after prostate cancer treatment.  How can I test my muscle strength?  As you urinate (pass water), try to stop your stream of urine: Tighten the muscle around your urethra. If you can stop the stream, then you have good muscle control.  To maintain good control, you need to exercise these muscles regularly.  If you cannot stop your stream, Kegels can help you improve your muscle control.  How do I do Kegel exercises?  1. Squeeze and lift the muscles around the urethra. (You should feel the muscles lift near the urethra or tighten in your rectum.)  2. Hold them tight as you count to 5 or 10.  3. Then, slowly relax these muscles as you count to 5 or 10.  4. Repeat five times.  Do these exercises three times a day: Five times in the morning, five times in the afternoon and five times at night.  Where to exercise  You may do the exercises anywhere and anytime. For instance:    At red traffic lights.    During TV commercials.    During coffee breaks.    While waiting for the bus.    At the grocery store.    When brushing your teeth.    During sex (for women).  Common mistakes  Don't use the muscles in your stomach, legs or buttocks. Your leg and buttock muscles should not move during these exercises.  To check your stomach muscles, put your hand on your stomach when you do your Kegels. If you feel your stomach move,  then you are using the wrong muscles.  Can these exercises hurt me?  No, these exercises cannot harm you in any way. You should find them relaxing and easy.  Back or stomach pain may mean you are using your stomach muscles.  If you get headaches and your chest muscles are tense, you are likely holding your breath. Try to breathe normally during your Kegels.  When will I notice a change?  If you have weak bladder control, you will notice a change after four to six weeks of daily exercise. After three months, you will see an even bigger difference.  Make these exercises a habit: Tighten the muscles when you walk, before you cough, as you stand up and on the way to the bathroom.  For informational purposes only. Not to replace the advice of your health care provider. Copyright   1981, 2009 [employer]. All rights reserved. [employer] [id number] - REV 06/16.  For informational purposes only. Not to replace the advice of your health care provider.  Copyright   2018 [employer]. All rights reserved.

## 2019-08-09 ASSESSMENT — ANXIETY QUESTIONNAIRES: GAD7 TOTAL SCORE: 0

## 2019-10-01 ENCOUNTER — HEALTH MAINTENANCE LETTER (OUTPATIENT)
Age: 74
End: 2019-10-01

## 2020-02-20 DIAGNOSIS — M81.0 OSTEOPOROSIS, SENILE: ICD-10-CM

## 2020-02-21 RX ORDER — ALENDRONATE SODIUM 70 MG/1
TABLET ORAL
Qty: 12 TABLET | Refills: 3 | OUTPATIENT
Start: 2020-02-21

## 2020-02-21 NOTE — TELEPHONE ENCOUNTER
"Requested Prescriptions   Pending Prescriptions Disp Refills     alendronate 70 MG PO tablet [Pharmacy Med Name: ALENDRONATE 70MG TABLETS] 12 tablet 3     Sig: TAKE 1 TABLET BY MOUTH WITH 8 OZ OF WATER EVERY 7 DAYS 30 MINUTES BEFORE BREAKFAST AND REMAIN UPRIGHT DURING THIS TIME       Bisphosphonates Failed - 2/20/2020  3:33 AM        Failed - Normal serum creatinine on file within past 12 months     Recent Labs   Lab Test 09/06/18  1435   CR 0.72             Passed - Recent (12 mo) or future (30 days) visit within the authorizing provider's specialty     Patient has had an office visit with the authorizing provider or a provider within the authorizing providers department within the previous 12 mos or has a future within next 30 days. See \"Patient Info\" tab in inbasket, or \"Choose Columns\" in Meds & Orders section of the refill encounter.              Passed - Dexa on file within past 2 years     Please review last Dexa result.           Passed - Medication is active on med list        Passed - Patient is age 18 or older        Refills available  Marichuy Hutchison RN on 2/21/2020 at 10:55 AM    "

## 2020-05-04 DIAGNOSIS — M81.0 OSTEOPOROSIS, SENILE: ICD-10-CM

## 2020-05-04 RX ORDER — ALENDRONATE SODIUM 70 MG/1
TABLET ORAL
Qty: 12 TABLET | Refills: 1 | Status: SHIPPED | OUTPATIENT
Start: 2020-05-04 | End: 2020-08-10

## 2020-05-04 NOTE — TELEPHONE ENCOUNTER
"Requested Prescriptions   Pending Prescriptions Disp Refills     alendronate (FOSAMAX) 70 MG tablet [Pharmacy Med Name: ALENDRONATE 70MG TABLETS] 12 tablet 3     Sig: TAKE 1 TABLET BY MOUTH WITH 8 OZ OF WATER EVERY 7 DAYS 30 MINUTES BEFORE BREAKFAST AND REMAIN UPRIGHT DURING THIS TIME       Bisphosphonates Failed - 5/4/2020  9:51 AM        Failed - Normal serum creatinine on file within past 12 months     Recent Labs   Lab Test 09/06/18  1435   CR 0.72       Ok to refill medication if creatinine is low          Passed - Recent (12 mo) or future (30 days) visit within the authorizing provider's specialty     Patient has had an office visit with the authorizing provider or a provider within the authorizing providers department within the previous 12 mos or has a future within next 30 days. See \"Patient Info\" tab in inbasket, or \"Choose Columns\" in Meds & Orders section of the refill encounter.              Passed - Dexa on file within past 2 years     Please review last Dexa result.           Passed - Medication is active on med list        Passed - Patient is age 18 or older           Last Written Prescription Date:  8/8/19  Last Fill Quantity: 12,  # refills: 3   Last office visit: 8/8/2019 with prescribing provider:  DR Godoy   Future Office Visit:      Called and pharmacy does not have on file. Resent Rx to get to annual due after 8/8/20    Called pt and left detailed vm - Rx sent and call pharmacy for . Encouraged to call with questions.    Leyla Salcedo RN on 5/4/2020 at 3:39 PM            "

## 2020-05-04 NOTE — TELEPHONE ENCOUNTER
Patient called pharmacy to refill medication alendronate (FOSAMAX) 70 MG tablet, but it was denied. Wondering if she could speak with a nurse or Dr. Godoy regarding why?  Please call back

## 2020-08-05 NOTE — PROGRESS NOTES
Mima is a 75 year old  female who presents for annual exam.     Besides routine health maintenance,  she would like to discuss Dex .    Do you have a Health Care Directive?: Yes, patient states has an Advance Care Planning document and will bring a copy to the clinic.    Fall risk:   Fallen 2 or more times in the past year?: No  Any fall with injury in the past year?: No    HPI:  The patient's PCP is  Damián Fowler MD.      Plans to retire this week. 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     GYNECOLOGIC HISTORY:  No LMP recorded. Patient is postmenopausal..   reports that she has never smoked. She has never used smokeless tobacco.    Patient is sexually active.  STD testing offered?  Declined  Last PHQ-9 score on record=   PHQ-9 SCORE 8/10/2020   PHQ-9 Total Score 2     Last GAD7 score on record=   DAVID-7 SCORE 2018 2019 8/10/2020   Total Score 0 0 2     Alcohol Score = 4    HEALTH MAINTENANCE:  Cholesterol:   Recent Labs   Lab Test 18  0933 16  0751   CHOL 250* 227*   HDL 76 71   * 140*   TRIG 94 80     Last Mammo: One year ago, Result: Normal, Next Mammo: 2020   Pap: (No results found for: PAP )  DEXA: 7/3/2018  Colonoscopy:  2019, Result:  Polyp,, Next Colonoscopy: 3 years.    Health maintenance updated:  yes    HISTORY:  OB History    Para Term  AB Living   2 2 2 0 0 2   SAB TAB Ectopic Multiple Live Births   0 0 0 0 2      # Outcome Date GA Lbr Juan Pablo/2nd Weight Sex Delivery Anes PTL Lv   2 Term         MANOHAR   1 Term         MANOHAR     Patient Active Problem List   Diagnosis     Hyperlipidemia LDL goal <130     Pap smear of cervix not needed     Past Surgical History:   Procedure Laterality Date     cryotherapy      No AIDAN 2/3 since     ENDOMETRIAL SAMPLING (BIOPSY)  ;;-Prolif endo;-prolif endo;-prolif endo     OTHER SURGICAL HISTORY      REMOVAL NODULES ON VOCAL CORDS      Social History     Tobacco Use     Smoking status: Never  "Smoker     Smokeless tobacco: Never Used   Substance Use Topics     Alcohol use: Yes     Alcohol/week: 0.0 standard drinks     Frequency: 4 or more times a week     Drinks per session: 1 or 2     Binge frequency: Never     Comment: 3 times/wk      Problem (# of Occurrences) Relation (Name,Age of Onset)    Colon Polyps (1) Father    Fractures (1) Mother (83): hip; had previously had radiation therapy for uterine cancer    Osteoporosis (2) Mother, Maternal Grandmother    Uterine Cancer (1) Mother            Current Outpatient Medications   Medication Sig     alendronate (FOSAMAX) 70 MG tablet TAKE 1 TABLET BY MOUTH WITH 8 OZ OF WATER EVERY 7 DAYS 30 MINUTES BEFORE BREAKFAST AND REMAIN UPRIGHT DURING THIS TIME     aspirin (ASA) 81 MG chewable tablet Take 81 mg by mouth daily     calcium-vitamin D (CALTRATE) 600-400 MG-UNIT per tablet Take 1 tablet by mouth daily. Takes 2 tablets in am      Multiple Vitamins-Minerals (MULTIVITAMIN & MINERAL PO) Take 1 tablet by mouth daily.     SHINGRIX injection      No current facility-administered medications for this visit.        No Known Allergies    Past medical, surgical, social and family history were reviewed and updated in EPIC.    ROS:   12 point review of systems negative other than symptoms noted below or in the HPI.  No urinary frequency or dysuria, bladder or kidney problems    EXAM:  /78   Pulse 78   Ht 1.651 m (5' 5\")   Wt 69.4 kg (153 lb)   BMI 25.46 kg/m     BMI: Body mass index is 25.46 kg/m .    EXAM:  Constitutional: Appearance: Well nourished, well developed alert, in no acute distress  Neck:  Lymph Nodes:  No lymphadenopathy present    Thyroid:  Gland size normal, nontender, no nodules or masses present  on palpation  Chest:  Respiratory Effort:  Breathing unlabored  Cardiovascular:Heart    Auscultation:  Regular rate, normal rhythm, no murmurs present  Breasts: Inspection of Breasts:  No lymphadenopathy present., Palpation of Breasts and Axillae:  No " masses present on palpation, no breast tenderness., Axillary Lymph Nodes:  No lymphadenopathy present. and No nodularity, asymmetry or nipple discharge bilaterally.  Gastrointestinal:  Abdominal Examination:  Abdomen nontender to palpation, tone normal without     rigidity or guarding, no masses present, umbilicus without lesions    Liver and speen:  No hepatomegaly present, liver nontender to palpation    Hernias:  No hernias present  Lymphatic: Lymph Nodes:  No other lymphadenopathy present  Skin:  General Inspection:  No rashes present, no lesions present, no areas of  discoloration.    Genitalia and Groin:  No rashes present, no lesions present, no areas of  discoloration, no masses present  Neurologic/Psychiatric:    Mental Status:  Oriented X3     Pelvic Exam:  External Genitalia:     Normal appearance for age, no discharge present, no tenderness present, no inflammatory lesions present, color normal  Vagina:     Normal vaginal vault without central or paravaginal defects, no discharge present, no inflammatory lesions present, no masses present  Bladder:     Nontender to palpation  Urethra:   Urethral Body:  Urethra palpation normal, urethra structural support normal   Urethral Meatus:  No erythema or lesions present  Cervix:     Appearance healthy, no lesions present, nontender to palpation, no bleeding present  Uterus:     Uterus: firm, normal sized and nontender, midplane in position.   Adnexa:     No adnexal tenderness present, no adnexal masses present  Perineum:     Perineum within normal limits, no evidence of trauma, no rashes or skin lesions present  Anus:     Anus within normal limits, no hemorrhoids present  Inguinal Lymph Nodes:     No lymphadenopathy present  Pubic Hair:     Normal pubic hair distribution for age  Genitalia and Groin:     No rashes present, no lesions present, no areas of discoloration, no masses present      COUNSELING:   Reviewed preventive health counseling, as reflected in  patient instructions       Osteoporosis Prevention/Bone Health    BMI:  Body mass index is 25.46 kg/m .     reports that she has never smoked. She has never used smokeless tobacco.      ASSESSMENT:  75 year old female with satisfactory annual exam.    ICD-10-CM    1. Encounter for gynecological examination without abnormal finding  Z01.419    2. Osteoporosis, senile  M81.0 alendronate (FOSAMAX) 70 MG tablet     DX Hip/Pelvis/Spine   3. Encounter for lipid screening for cardiovascular disease  Z13.220 Lipid panel reflex to direct LDL Fasting    Z13.6    4. Screening for metabolic disorder  Z13.228 Comprehensive metabolic panel   5. Pap smear of cervix not needed  Z53.8    6. Screening for disorder of blood and blood-forming organs  Z13.0 CBC with platelets       PLAN:  -No further paps required  -Breast self awareness discussed. Scheduled  for mammogram Sept.  -Discussed exercise-making plan, strength training. Nutrition encouraged.  -Colonoscopy UTD  -Osteoporosis prevention discussed.  -Fasting for labs, requests these done today.   -PMB precautions  -Return one year for next annual exam          Kirstin Bower Masters, DO

## 2020-08-10 ENCOUNTER — OFFICE VISIT (OUTPATIENT)
Dept: OBGYN | Facility: CLINIC | Age: 75
End: 2020-08-10
Payer: COMMERCIAL

## 2020-08-10 VITALS
WEIGHT: 153 LBS | HEART RATE: 78 BPM | DIASTOLIC BLOOD PRESSURE: 78 MMHG | SYSTOLIC BLOOD PRESSURE: 118 MMHG | HEIGHT: 65 IN | BODY MASS INDEX: 25.49 KG/M2

## 2020-08-10 DIAGNOSIS — Z53.8 PAP SMEAR OF CERVIX NOT NEEDED: ICD-10-CM

## 2020-08-10 DIAGNOSIS — Z13.220 ENCOUNTER FOR LIPID SCREENING FOR CARDIOVASCULAR DISEASE: ICD-10-CM

## 2020-08-10 DIAGNOSIS — Z13.228 SCREENING FOR METABOLIC DISORDER: ICD-10-CM

## 2020-08-10 DIAGNOSIS — Z13.0 SCREENING FOR DISORDER OF BLOOD AND BLOOD-FORMING ORGANS: ICD-10-CM

## 2020-08-10 DIAGNOSIS — Z01.419 ENCOUNTER FOR GYNECOLOGICAL EXAMINATION WITHOUT ABNORMAL FINDING: Primary | ICD-10-CM

## 2020-08-10 DIAGNOSIS — Z13.6 ENCOUNTER FOR LIPID SCREENING FOR CARDIOVASCULAR DISEASE: ICD-10-CM

## 2020-08-10 DIAGNOSIS — M81.0 OSTEOPOROSIS, SENILE: ICD-10-CM

## 2020-08-10 LAB
ERYTHROCYTE [DISTWIDTH] IN BLOOD BY AUTOMATED COUNT: 12.6 % (ref 10–15)
HCT VFR BLD AUTO: 41 % (ref 35–47)
HGB BLD-MCNC: 13.4 G/DL (ref 11.7–15.7)
MCH RBC QN AUTO: 29.3 PG (ref 26.5–33)
MCHC RBC AUTO-ENTMCNC: 32.7 G/DL (ref 31.5–36.5)
MCV RBC AUTO: 90 FL (ref 78–100)
PLATELET # BLD AUTO: 222 10E9/L (ref 150–450)
RBC # BLD AUTO: 4.57 10E12/L (ref 3.8–5.2)
WBC # BLD AUTO: 5.5 10E9/L (ref 4–11)

## 2020-08-10 PROCEDURE — 80053 COMPREHEN METABOLIC PANEL: CPT | Performed by: OBSTETRICS & GYNECOLOGY

## 2020-08-10 PROCEDURE — 85027 COMPLETE CBC AUTOMATED: CPT | Performed by: OBSTETRICS & GYNECOLOGY

## 2020-08-10 PROCEDURE — 36415 COLL VENOUS BLD VENIPUNCTURE: CPT | Performed by: OBSTETRICS & GYNECOLOGY

## 2020-08-10 PROCEDURE — 80061 LIPID PANEL: CPT | Performed by: OBSTETRICS & GYNECOLOGY

## 2020-08-10 PROCEDURE — 99397 PER PM REEVAL EST PAT 65+ YR: CPT | Performed by: OBSTETRICS & GYNECOLOGY

## 2020-08-10 RX ORDER — ZOSTER VACCINE RECOMBINANT, ADJUVANTED 50 MCG/0.5
KIT INTRAMUSCULAR
COMMUNITY
Start: 2019-10-30 | End: 2022-11-02

## 2020-08-10 RX ORDER — ASPIRIN 81 MG/1
81 TABLET, CHEWABLE ORAL DAILY
COMMUNITY

## 2020-08-10 RX ORDER — ALENDRONATE SODIUM 70 MG/1
TABLET ORAL
Qty: 12 TABLET | Refills: 4 | Status: SHIPPED | OUTPATIENT
Start: 2020-08-10 | End: 2021-08-31

## 2020-08-10 SDOH — HEALTH STABILITY: MENTAL HEALTH: HOW OFTEN DO YOU HAVE 6 OR MORE DRINKS ON ONE OCCASION?: NEVER

## 2020-08-10 SDOH — HEALTH STABILITY: MENTAL HEALTH: HOW MANY STANDARD DRINKS CONTAINING ALCOHOL DO YOU HAVE ON A TYPICAL DAY?: 1 OR 2

## 2020-08-10 SDOH — HEALTH STABILITY: MENTAL HEALTH: HOW OFTEN DO YOU HAVE A DRINK CONTAINING ALCOHOL?: 4 OR MORE TIMES A WEEK

## 2020-08-10 ASSESSMENT — MIFFLIN-ST. JEOR: SCORE: 1199.88

## 2020-08-10 ASSESSMENT — PATIENT HEALTH QUESTIONNAIRE - PHQ9
5. POOR APPETITE OR OVEREATING: NOT AT ALL
SUM OF ALL RESPONSES TO PHQ QUESTIONS 1-9: 2

## 2020-08-10 ASSESSMENT — ANXIETY QUESTIONNAIRES
IF YOU CHECKED OFF ANY PROBLEMS ON THIS QUESTIONNAIRE, HOW DIFFICULT HAVE THESE PROBLEMS MADE IT FOR YOU TO DO YOUR WORK, TAKE CARE OF THINGS AT HOME, OR GET ALONG WITH OTHER PEOPLE: NOT DIFFICULT AT ALL
7. FEELING AFRAID AS IF SOMETHING AWFUL MIGHT HAPPEN: NOT AT ALL
3. WORRYING TOO MUCH ABOUT DIFFERENT THINGS: NOT AT ALL
1. FEELING NERVOUS, ANXIOUS, OR ON EDGE: SEVERAL DAYS
2. NOT BEING ABLE TO STOP OR CONTROL WORRYING: NOT AT ALL
GAD7 TOTAL SCORE: 2
6. BECOMING EASILY ANNOYED OR IRRITABLE: SEVERAL DAYS
5. BEING SO RESTLESS THAT IT IS HARD TO SIT STILL: NOT AT ALL

## 2020-08-10 NOTE — PATIENT INSTRUCTIONS
-Daily total calcium intake (between food/supplements) should be 1200mg which equates to 5 servings calcium containing food per day; VItamin D 1000IU.   Foods rich in calcium are: milk, cheese, yogurt, seafood, sardines and canned salmon, leafy green vegetables such as melida greens, spinach and kale, beans and lentils, almonds, seeds (poppy, sesame, celery, heide), rhubarb, dried fruit such as figs, whey protein, tofu and edamame, amaranth, other foods with added calcium such as orange juice and some cereals.   If adequate amount not taken in diet, then a supplement may be needed.     -I also recommend increasing your dietary fiber by starting Metamucil (powder mixed in glass of water) twice daily

## 2020-08-11 LAB
ALBUMIN SERPL-MCNC: 4.2 G/DL (ref 3.4–5)
ALP SERPL-CCNC: 53 U/L (ref 40–150)
ALT SERPL W P-5'-P-CCNC: 28 U/L (ref 0–50)
ANION GAP SERPL CALCULATED.3IONS-SCNC: 8 MMOL/L (ref 3–14)
AST SERPL W P-5'-P-CCNC: 23 U/L (ref 0–45)
BILIRUB SERPL-MCNC: 0.5 MG/DL (ref 0.2–1.3)
BUN SERPL-MCNC: 16 MG/DL (ref 7–30)
CALCIUM SERPL-MCNC: 9 MG/DL (ref 8.5–10.1)
CHLORIDE SERPL-SCNC: 106 MMOL/L (ref 94–109)
CHOLEST SERPL-MCNC: 283 MG/DL
CO2 SERPL-SCNC: 24 MMOL/L (ref 20–32)
CREAT SERPL-MCNC: 0.71 MG/DL (ref 0.52–1.04)
GFR SERPL CREATININE-BSD FRML MDRD: 84 ML/MIN/{1.73_M2}
GLUCOSE SERPL-MCNC: 111 MG/DL (ref 70–99)
HDLC SERPL-MCNC: 65 MG/DL
LDLC SERPL CALC-MCNC: 183 MG/DL
NONHDLC SERPL-MCNC: 218 MG/DL
POTASSIUM SERPL-SCNC: 4.2 MMOL/L (ref 3.4–5.3)
PROT SERPL-MCNC: 7.6 G/DL (ref 6.8–8.8)
SODIUM SERPL-SCNC: 138 MMOL/L (ref 133–144)
TRIGL SERPL-MCNC: 176 MG/DL

## 2020-08-11 ASSESSMENT — ANXIETY QUESTIONNAIRES: GAD7 TOTAL SCORE: 2

## 2020-09-30 ENCOUNTER — ANCILLARY PROCEDURE (OUTPATIENT)
Dept: BONE DENSITY | Facility: CLINIC | Age: 75
End: 2020-09-30
Attending: OBSTETRICS & GYNECOLOGY
Payer: MEDICARE

## 2020-09-30 ENCOUNTER — ANCILLARY PROCEDURE (OUTPATIENT)
Dept: MAMMOGRAPHY | Facility: CLINIC | Age: 75
End: 2020-09-30
Payer: MEDICARE

## 2020-09-30 DIAGNOSIS — M81.0 OSTEOPOROSIS, SENILE: ICD-10-CM

## 2020-09-30 DIAGNOSIS — Z12.31 VISIT FOR SCREENING MAMMOGRAM: ICD-10-CM

## 2020-09-30 PROCEDURE — 77067 SCR MAMMO BI INCL CAD: CPT | Mod: TC | Performed by: RADIOLOGY

## 2020-09-30 PROCEDURE — 77080 DXA BONE DENSITY AXIAL: CPT | Performed by: OBSTETRICS & GYNECOLOGY

## 2020-09-30 PROCEDURE — 77063 BREAST TOMOSYNTHESIS BI: CPT | Mod: TC | Performed by: RADIOLOGY

## 2020-10-12 DIAGNOSIS — M81.0 OSTEOPOROSIS, SENILE: ICD-10-CM

## 2020-10-12 RX ORDER — ALENDRONATE SODIUM 70 MG/1
TABLET ORAL
Qty: 12 TABLET | Refills: 4 | OUTPATIENT
Start: 2020-10-12

## 2020-10-12 NOTE — TELEPHONE ENCOUNTER
There are no medications in this encounter.     Last Written Prescription Date: 8/10/2020  Last Fill Quantity: 12,  # refills: 4   Last office visit: 8/10/2020 with prescribing provider:  Jayne Ferrari Office Visit: non

## 2021-01-15 ENCOUNTER — HEALTH MAINTENANCE LETTER (OUTPATIENT)
Age: 76
End: 2021-01-15

## 2021-04-06 DIAGNOSIS — M81.0 OSTEOPOROSIS, SENILE: ICD-10-CM

## 2021-04-06 RX ORDER — ALENDRONATE SODIUM 70 MG/1
TABLET ORAL
Qty: 12 TABLET | Refills: 4 | OUTPATIENT
Start: 2021-04-06

## 2021-04-06 NOTE — TELEPHONE ENCOUNTER
"Requested Prescriptions   Pending Prescriptions Disp Refills     alendronate (FOSAMAX) 70 MG tablet 12 tablet 4     Sig: TAKE 1 TABLET BY MOUTH WITH 8 OZ OF WATER EVERY 7 DAYS 30 MINUTES BEFORE BREAKFAST AND REMAIN UPRIGHT DURING THIS TIME       Bisphosphonates Passed - 4/6/2021  9:58 AM        Passed - Recent (12 mo) or future (30 days) visit within the authorizing provider's specialty     Patient has had an office visit with the authorizing provider or a provider within the authorizing providers department within the previous 12 mos or has a future within next 30 days. See \"Patient Info\" tab in inbasket, or \"Choose Columns\" in Meds & Orders section of the refill encounter.              Passed - Dexa on file within past 2 years     Please review last Dexa result.           Passed - Medication is active on med list        Passed - Patient is age 18 or older        Passed - Normal serum creatinine on file within past 12 months     Recent Labs   Lab Test 08/10/20  0957   CR 0.71       Ok to refill medication if creatinine is low             Last Written Prescription Date:  8/10/20  Last Fill Quantity: 12,  # refills: 4   Last office visit: 8/10/2020 with prescribing provider:  Jayne   Future Office Visit:  none  Refills available  Marichuy Hutchison RN on 4/6/2021 at 11:34 AM          "

## 2021-08-31 DIAGNOSIS — M81.0 OSTEOPOROSIS, SENILE: ICD-10-CM

## 2021-08-31 RX ORDER — ALENDRONATE SODIUM 70 MG/1
TABLET ORAL
Qty: 12 TABLET | Refills: 0 | Status: SHIPPED | OUTPATIENT
Start: 2021-08-31 | End: 2021-10-04

## 2021-08-31 NOTE — TELEPHONE ENCOUNTER
Prescription approved per [facility] Refill Protocol.  Leyla Salcedo RN on 8/31/2021 at 11:52 AM

## 2021-08-31 NOTE — TELEPHONE ENCOUNTER
"Requested Prescriptions   Pending Prescriptions Disp Refills     alendronate (FOSAMAX) 70 MG tablet [Pharmacy Med Name: ALENDRONATE 70MG TABLETS] 12 tablet 4     Sig: TAKE 1 TABLET BY MOUTH WITH 8 OZ OF WATER EVERY 7 DAYS 30 MINUTES BEFORE BREAKFAST AND REMAIN UPRIGHT DURING THIS TIME       Bisphosphonates Failed - 8/31/2021  9:33 AM        Failed - Recent (12 mo) or future (30 days) visit within the authorizing provider's specialty     Patient has had an office visit with the authorizing provider or a provider within the authorizing providers department within the previous 12 mos or has a future within next 30 days. See \"Patient Info\" tab in inbasket, or \"Choose Columns\" in Meds & Orders section of the refill encounter.              Failed - Normal serum creatinine on file within past 12 months     Recent Labs   Lab Test 08/10/20  0957   CR 0.71       Ok to refill medication if creatinine is low          Passed - Dexa on file within past 2 years     Please review last Dexa result.           Passed - Medication is active on med list        Passed - Patient is age 18 or older           Last Written Prescription Date:  8/10/20  Last Fill Quantity: 12,  # refills: 4   Last office visit: 8/10/2020 with prescribing provider:  Dr Godoy   Future Office Visit:   Next 5 appointments (look out 90 days)    Oct 04, 2021  3:40 PM  PHYSICAL with Kirstin Martínezs, DO  [facility] ([facility] [address] [id number] [phone]                 "

## 2021-09-04 ENCOUNTER — HEALTH MAINTENANCE LETTER (OUTPATIENT)
Age: 76
End: 2021-09-04

## 2021-09-28 NOTE — PROGRESS NOTES
Mima is a 76 year old  female who presents for annual exam.       Do you have a Health Care Directive?: Yes, patient states has an Advance Care Planning document and will bring a copy to the clinic.    Fall risk:   Fallen 2 or more times in the past year?: No  Any fall with injury in the past year?: Yes    HPI:  The patient's PCP is  Damián Fowler MD. follows with PCP regularly, next in November.  Plans to do cholesterol testing with him.      Teaching online part time.  Different school.     Feels like she has some more urgency, urinary pressure prior. No leaking. Not noticed any vaginal bulges. Urinates q 2-3hrs    Her daughter and  are currently going through couples therapy and individual counseling, having a really hard time, 3 grandchildren are involved.  Would like to go to counseling herself to help learn better how to cope and perhaps help them.  Has been very stressful on her over the last year.    Doing well on Fosamax.  No concerns.      GYNECOLOGIC HISTORY:  No LMP recorded. Patient is postmenopausal..   reports that she has never smoked. She has never used smokeless tobacco.    Patient is sexually active.  STD testing offered?  Declined  Last PHQ-9 score on record=   PHQ-9 SCORE 10/4/2021   PHQ-9 Total Score 2     Last GAD7 score on record=   DAVID-7 SCORE 2019 8/10/2020 10/4/2021   Total Score 0 2 0     Alcohol Score = 1    HEALTH MAINTENANCE:  Cholesterol:   Recent Labs   Lab Test 08/10/20  0957 18  0933   CHOL 283* 250*   HDL 65 76   * 155*   TRIG 176* 94        Last Mammo: One year ago, Result: Normal, Next Mammo: Today   Pap: (No results found for: PAP )  DEXA:  2020 due   Colonoscopy:  2019, Result:  Polyp,, Next Colonoscopy: 3 years.    Health maintenance updated:  yes    HISTORY:  OB History    Para Term  AB Living   2 2 2 0 0 2   SAB TAB Ectopic Multiple Live Births   0 0 0 0 2      # Outcome Date GA Lbr Juan Pablo/2nd Weight Sex  "Delivery Anes PTL Lv   2 Term         MANOHAR   1 Term         MANOHAR     Patient Active Problem List   Diagnosis     Hyperlipidemia LDL goal <130     Pap smear of cervix not needed     Past Surgical History:   Procedure Laterality Date     cryotherapy  1978    No AIDAN 2/3 since     ENDOMETRIAL SAMPLING (BIOPSY)  4/97;11/98;4/00 '97-Prolif endo;'98-prolif endo;4/00-prolif endo     OTHER SURGICAL HISTORY  1977    REMOVAL NODULES ON VOCAL CORDS      Social History     Tobacco Use     Smoking status: Never Smoker     Smokeless tobacco: Never Used   Substance Use Topics     Alcohol use: Yes     Alcohol/week: 0.0 standard drinks     Comment: 3 times/wk      Problem (# of Occurrences) Relation (Name,Age of Onset)    Colon Polyps (1) Father    Fractures (1) Mother (83): hip; had previously had radiation therapy for uterine cancer    Osteoporosis (2) Mother, Maternal Grandmother    Uterine Cancer (1) Mother            Current Outpatient Medications   Medication Sig     alendronate (FOSAMAX) 70 MG tablet TAKE 1 TABLET BY MOUTH WITH 8 OZ OF WATER EVERY 7 DAYS 30 MINUTES BEFORE BREAKFAST AND REMAIN UPRIGHT DURING THIS TIME     aspirin (ASA) 81 MG chewable tablet Take 81 mg by mouth daily     calcium-vitamin D (CALTRATE) 600-400 MG-UNIT per tablet Take 1 tablet by mouth daily. Takes 2 tablets in am      Multiple Vitamins-Minerals (MULTIVITAMIN & MINERAL PO) Take 1 tablet by mouth daily.     SHINGRIX injection      No current facility-administered medications for this visit.       No Known Allergies    Past medical, surgical, social and family history were reviewed and updated in EPIC.    ROS:   12 point review of systems negative other than symptoms noted below or in the HPI.  No urinary frequency or dysuria, bladder or kidney problems    EXAM:  /68   Pulse 78   Ht 1.651 m (5' 5\")   Wt 68.5 kg (151 lb)   BMI 25.13 kg/m     BMI: Body mass index is 25.13 kg/m .    EXAM:  Constitutional: Appearance: Well nourished, well " developed alert, in no acute distress  Neck:  Lymph Nodes:  No lymphadenopathy present    Thyroid:  Gland size normal, nontender, no nodules or masses present  on palpation  Chest:  Respiratory Effort:  Breathing unlabored  Cardiovascular:Heart    Auscultation:  Regular rate, normal rhythm, no murmurs present  Breasts: Inspection of Breasts:  No lymphadenopathy present., Palpation of Breasts and Axillae:  No masses present on palpation, no breast tenderness., Axillary Lymph Nodes:  No lymphadenopathy present. and No nodularity, asymmetry or nipple discharge bilaterally.  Gastrointestinal:  Abdominal Examination:  Abdomen nontender to palpation, tone normal without     rigidity or guarding, no masses present, umbilicus without lesions    Liver and speen:  No hepatomegaly present, liver nontender to palpation    Hernias:  No hernias present  Lymphatic: Lymph Nodes:  No other lymphadenopathy present  Skin:  General Inspection:  No rashes present, no lesions present, no areas of  discoloration.    Genitalia and Groin:  No rashes present, no lesions present, no areas of  discoloration, no masses present  Neurologic/Psychiatric:    Mental Status:  Oriented X3     Pelvic Exam:  External Genitalia:     Normal appearance for age, no discharge present, no tenderness present, no inflammatory lesions present, color normal  Vagina:     Normal vaginal vault without central or paravaginal defects, no discharge present, no inflammatory lesions present, no masses present  Bladder:     Nontender to palpation  Urethra:   Urethral Body:  Urethra palpation normal, urethra structural support normal   Urethral Meatus:  No erythema or lesions present  Cervix:     Appearance healthy, no lesions present, nontender to palpation, no bleeding present  Uterus:     Uterus: firm, normal sized and nontender, midplane in position.   Adnexa:     No adnexal tenderness present, no adnexal masses present  Perineum:     Perineum within normal limits, no  evidence of trauma, no rashes or skin lesions present  Anus:     Anus within normal limits, no hemorrhoids present  Inguinal Lymph Nodes:     No lymphadenopathy present  Pubic Hair:     Normal pubic hair distribution for age  Genitalia and Groin:     No rashes present, no lesions present, no areas of discoloration, no masses present      COUNSELING:   Reviewed preventive health counseling, as reflected in patient instructions       Osteoporosis prevention/bone health    BMI:  Body mass index is 25.13 kg/m .     reports that she has never smoked. She has never used smokeless tobacco.      ASSESSMENT:  76 year old female with satisfactory annual exam.    ICD-10-CM    1. Encounter for gynecological examination without abnormal finding  Z01.419    2. Stress due to family tension  Z63.8 MENTAL HEALTH REFERRAL  - Adult; Outpatient Treatment; Individual/Couples/Family/Group Therapy/Health Psychology; Other: [facility] [phone]; We will contact you to schedule the appointment or please call with any questions   3. Urinary frequency  R35.0 [facility]   4. Osteoporosis, senile  M81.0 alendronate (FOSAMAX) 70 MG tablet   5. Need for prophylactic vaccination and inoculation against influenza  Z23 INFLUENZA, QUAD, HIGH DOSE, PF, 65YR + (FLUZONE HD)   6. Pap smear of cervix not needed  Z53.8    7. Cystocele, midline  N81.11 [facility]       PLAN:  -No further Paps needed  -Breast self awareness discussed.  Up-to-date for mammogram.  -Discussed exercise-making plan, strength training. Nutrition encouraged.  -Colonoscopy due in 2022 due to history of colon polyps  -Osteoporosis prevention discussed.  Doing well on Fosamax, started 2018.  Due for follow-up bone density 2022.  Discussed likely we will be considering drug holiday in the next 1 to 2 years.  -PCP manages labs  -Postmenopausal bleeding precautions  -Family stress.  Requesting referral for mental health.  Order placed, also given  sheet on local therapists  -Cystocele and urinary frequency.  Still has good control and can go a normal interval between voids.  Grade 2 cystocele present.  Discussed pessary and surgery, when each is used or may be indicated.  Patient is interested in pelvic floor physical therapy.  Discussed this will not reverse changes but rather may slow progression, and improve her pelvic floor strength and potentially her urinary symptoms as well.  Patient will check with her insurance.  -Return one year for next annual exam      (Additional 10 minutes was spent on the date of the encounter doing chart review,  history and/or exam, documentation, patient counseling, disorder counseling/management and referral discussion.)      Kirstin Bower Masters, DO

## 2021-10-04 ENCOUNTER — OFFICE VISIT (OUTPATIENT)
Dept: OBGYN | Facility: CLINIC | Age: 76
End: 2021-10-04
Payer: MEDICARE

## 2021-10-04 ENCOUNTER — ANCILLARY PROCEDURE (OUTPATIENT)
Dept: MAMMOGRAPHY | Facility: CLINIC | Age: 76
End: 2021-10-04
Payer: MEDICARE

## 2021-10-04 VITALS
HEART RATE: 78 BPM | HEIGHT: 65 IN | SYSTOLIC BLOOD PRESSURE: 110 MMHG | WEIGHT: 151 LBS | BODY MASS INDEX: 25.16 KG/M2 | DIASTOLIC BLOOD PRESSURE: 68 MMHG

## 2021-10-04 DIAGNOSIS — Z12.31 VISIT FOR SCREENING MAMMOGRAM: ICD-10-CM

## 2021-10-04 DIAGNOSIS — Z01.419 ENCOUNTER FOR GYNECOLOGICAL EXAMINATION WITHOUT ABNORMAL FINDING: Primary | ICD-10-CM

## 2021-10-04 DIAGNOSIS — M81.0 OSTEOPOROSIS, SENILE: ICD-10-CM

## 2021-10-04 DIAGNOSIS — Z53.8 PAP SMEAR OF CERVIX NOT NEEDED: ICD-10-CM

## 2021-10-04 DIAGNOSIS — Z23 NEED FOR PROPHYLACTIC VACCINATION AND INOCULATION AGAINST INFLUENZA: ICD-10-CM

## 2021-10-04 DIAGNOSIS — R35.0 URINARY FREQUENCY: ICD-10-CM

## 2021-10-04 DIAGNOSIS — N81.11 CYSTOCELE, MIDLINE: ICD-10-CM

## 2021-10-04 DIAGNOSIS — Z63.8 STRESS DUE TO FAMILY TENSION: ICD-10-CM

## 2021-10-04 PROCEDURE — 99397 PER PM REEVAL EST PAT 65+ YR: CPT | Mod: 25 | Performed by: OBSTETRICS & GYNECOLOGY

## 2021-10-04 PROCEDURE — 77067 SCR MAMMO BI INCL CAD: CPT | Mod: TC | Performed by: RADIOLOGY

## 2021-10-04 PROCEDURE — 90662 IIV NO PRSV INCREASED AG IM: CPT | Performed by: OBSTETRICS & GYNECOLOGY

## 2021-10-04 PROCEDURE — 99212 OFFICE O/P EST SF 10 MIN: CPT | Mod: 25 | Performed by: OBSTETRICS & GYNECOLOGY

## 2021-10-04 PROCEDURE — G0008 ADMIN INFLUENZA VIRUS VAC: HCPCS | Performed by: OBSTETRICS & GYNECOLOGY

## 2021-10-04 PROCEDURE — 77063 BREAST TOMOSYNTHESIS BI: CPT | Mod: TC | Performed by: RADIOLOGY

## 2021-10-04 RX ORDER — ALENDRONATE SODIUM 70 MG/1
TABLET ORAL
Qty: 12 TABLET | Refills: 5 | Status: SHIPPED | OUTPATIENT
Start: 2021-10-04 | End: 2022-10-27

## 2021-10-04 RX ORDER — ALENDRONATE SODIUM 70 MG/1
TABLET ORAL
Qty: 12 TABLET | Refills: 3 | Status: CANCELLED | OUTPATIENT
Start: 2021-10-04

## 2021-10-04 SDOH — SOCIAL STABILITY - SOCIAL INSECURITY: OTHER SPECIFIED PROBLEMS RELATED TO PRIMARY SUPPORT GROUP: Z63.8

## 2021-10-04 ASSESSMENT — PATIENT HEALTH QUESTIONNAIRE - PHQ9
5. POOR APPETITE OR OVEREATING: NOT AT ALL
SUM OF ALL RESPONSES TO PHQ QUESTIONS 1-9: 2

## 2021-10-04 ASSESSMENT — ANXIETY QUESTIONNAIRES
6. BECOMING EASILY ANNOYED OR IRRITABLE: NOT AT ALL
2. NOT BEING ABLE TO STOP OR CONTROL WORRYING: NOT AT ALL
GAD7 TOTAL SCORE: 0
1. FEELING NERVOUS, ANXIOUS, OR ON EDGE: NOT AT ALL
7. FEELING AFRAID AS IF SOMETHING AWFUL MIGHT HAPPEN: NOT AT ALL
5. BEING SO RESTLESS THAT IT IS HARD TO SIT STILL: NOT AT ALL
IF YOU CHECKED OFF ANY PROBLEMS ON THIS QUESTIONNAIRE, HOW DIFFICULT HAVE THESE PROBLEMS MADE IT FOR YOU TO DO YOUR WORK, TAKE CARE OF THINGS AT HOME, OR GET ALONG WITH OTHER PEOPLE: NOT DIFFICULT AT ALL
3. WORRYING TOO MUCH ABOUT DIFFERENT THINGS: NOT AT ALL

## 2021-10-04 ASSESSMENT — MIFFLIN-ST. JEOR: SCORE: 1185.81

## 2021-10-05 ASSESSMENT — ANXIETY QUESTIONNAIRES: GAD7 TOTAL SCORE: 0

## 2021-10-30 ENCOUNTER — HEALTH MAINTENANCE LETTER (OUTPATIENT)
Age: 76
End: 2021-10-30

## 2021-12-30 ASSESSMENT — ANXIETY QUESTIONNAIRES
GAD7 TOTAL SCORE: 12
3. WORRYING TOO MUCH ABOUT DIFFERENT THINGS: NEARLY EVERY DAY
7. FEELING AFRAID AS IF SOMETHING AWFUL MIGHT HAPPEN: SEVERAL DAYS
GAD7 TOTAL SCORE: 12
2. NOT BEING ABLE TO STOP OR CONTROL WORRYING: NEARLY EVERY DAY
6. BECOMING EASILY ANNOYED OR IRRITABLE: SEVERAL DAYS
GAD7 TOTAL SCORE: 12
7. FEELING AFRAID AS IF SOMETHING AWFUL MIGHT HAPPEN: SEVERAL DAYS
1. FEELING NERVOUS, ANXIOUS, OR ON EDGE: NEARLY EVERY DAY
4. TROUBLE RELAXING: SEVERAL DAYS
5. BEING SO RESTLESS THAT IT IS HARD TO SIT STILL: NOT AT ALL

## 2021-12-31 ASSESSMENT — ANXIETY QUESTIONNAIRES: GAD7 TOTAL SCORE: 12

## 2022-01-01 DIAGNOSIS — M81.0 OSTEOPOROSIS, SENILE: ICD-10-CM

## 2022-01-03 ENCOUNTER — TELEPHONE (OUTPATIENT)
Dept: BEHAVIORAL HEALTH | Facility: CLINIC | Age: 77
End: 2022-01-03
Payer: MEDICARE

## 2022-01-03 RX ORDER — ALENDRONATE SODIUM 70 MG/1
TABLET ORAL
Qty: 12 TABLET | Refills: 5 | OUTPATIENT
Start: 2022-01-03

## 2022-01-03 NOTE — TELEPHONE ENCOUNTER
"Requested Prescriptions   Pending Prescriptions Disp Refills     alendronate (FOSAMAX) 70 MG tablet [Pharmacy Med Name: ALENDRONATE 70MG TABLETS] 12 tablet 5     Sig: TAKE 1 TABLET BY MOUTH WITH 8 OZ OF WATER EVERY 7 DAYS 30 MINUTES BEFORE BREAKFAST AND REMAIN UPRIGHT DURING THIS TIME       Bisphosphonates Failed - 1/1/2022  3:36 AM        Failed - Normal serum creatinine on file within past 12 months     Recent Labs   Lab Test 08/10/20  0957   CR 0.71       Ok to refill medication if creatinine is low          Passed - Recent (12 mo) or future (30 days) visit within the authorizing provider's specialty     Patient has had an office visit with the authorizing provider or a provider within the authorizing providers department within the previous 12 mos or has a future within next 30 days. See \"Patient Info\" tab in inbasket, or \"Choose Columns\" in Meds & Orders section of the refill encounter.              Passed - Dexa on file within past 2 years     Please review last Dexa result.           Passed - Medication is active on med list        Passed - Patient is age 18 or older           Last Written Prescription Date:  10/4/21  Last Fill Quantity: 12,  # refills: 5   Last office visit: 10/4/2021 with prescribing provider:  Dr Godoy   Future Office Visit:      Refill request refused due to :   [x] Refills available at pharmacy.  Request sent back to pharmacy as \"duplicate\"  [] Patient report no longer needing it  [] Medication discontinued           Leyla Salcedo RN on 1/3/2022 at 8:12 AM        "

## 2022-01-05 ENCOUNTER — VIRTUAL VISIT (OUTPATIENT)
Dept: PSYCHOLOGY | Facility: CLINIC | Age: 77
End: 2022-01-05
Payer: MEDICARE

## 2022-01-05 ENCOUNTER — FCC EXTENDED DOCUMENTATION (OUTPATIENT)
Dept: PSYCHOLOGY | Facility: CLINIC | Age: 77
End: 2022-01-05
Payer: MEDICARE

## 2022-01-05 DIAGNOSIS — F43.23 ADJUSTMENT DISORDER WITH MIXED ANXIETY AND DEPRESSED MOOD: Primary | ICD-10-CM

## 2022-01-05 PROCEDURE — 90791 PSYCH DIAGNOSTIC EVALUATION: CPT | Mod: 95

## 2022-01-05 ASSESSMENT — PATIENT HEALTH QUESTIONNAIRE - PHQ9: SUM OF ALL RESPONSES TO PHQ QUESTIONS 1-9: 7

## 2022-01-05 ASSESSMENT — COLUMBIA-SUICIDE SEVERITY RATING SCALE - C-SSRS
1. IN THE PAST MONTH, HAVE YOU WISHED YOU WERE DEAD OR WISHED YOU COULD GO TO SLEEP AND NOT WAKE UP?: NO
2. HAVE YOU ACTUALLY HAD ANY THOUGHTS OF KILLING YOURSELF LIFETIME?: NO

## 2022-01-05 NOTE — PROGRESS NOTES
"[facility]  Provider Name:  Chante Goncalves     Credentials:  YECENIA, DARCI    PATIENT'S NAME: Mima Barrett  PREFERRED NAME: Jennifer  PRONOUNS:   she, her    MRN: [id number]  : 1945  ADDRESS: [address] [place] [address]  ACCT. NUMBER:  [id number]  DATE OF SERVICE: 22  START TIME: 1000  END TIME: 1050  PREFERRED PHONE: [phone]  May we leave a program related message: Yes  SERVICE MODALITY:  Video Visit:      Provider verified identity through the following two step process.  Patient provided:  Patient  and Patient address    Telemedicine Visit: The patient's condition can be safely assessed and treated via synchronous audio and visual telemedicine encounter.      Reason for Telemedicine Visit: Patient has requested telehealth visit    Originating Site (Patient Location): Patient's home    Distant Site (Provider Location): Provider Remote Setting- Home Office    Consent:  The patient/guardian has verbally consented to: the potential risks and benefits of telemedicine (video visit) versus in person care; bill my insurance or make self-payment for services provided; and responsibility for payment of non-covered services.     Patient would like the video invitation sent by:  My Chart    Mode of Communication:  Video Conference via [employer]    As the provider I attest to compliance with applicable laws and regulations related to telemedicine.    UNIVERSAL ADULT Mental Health DIAGNOSTIC ASSESSMENT    Identifying Information:  Patient is a 76 year old,  .  The pronoun use throughout this assessment reflects the patient's chosen pronoun.  Patient was referred for an assessment by primary care provider.  Patient attended the session alone.    Chief Complaint:   The reason for seeking services at this time is: \"Difficult family situation dealing with probable daughter's divorce\" \"stress and anxiety) The problem(s) began 23.    Patient has not attempted to resolve " "these concerns in the past.    Social/Family History:  Patient reported [place].  They were raised by biological parents  .  Parents were always together \"\".  Patient reported that their childhood was \"very easy childhood, mom was a teacher and father was a superintendent. I have one brother, everyone was pretty much the same. Stress free and I consider it to be a happy childhood. No diversity.\"  Patient described their current relationships with family of origin as \"parents have both , they were a lot older when they had my brother and I. My brother is a doctor and we have a fair relationship not always honest. We may have been a little closer if he didn't move away in 9th grade for school. I think we are closer now than we have ever been. We talk about once a month and just now he retired.\"     The patient describes their cultural background as .  Cultural influences and impact on patient's life structure, values, norms, and healthcare: White, middle class, grew [place] in [facility] [place], after [place] lived urban.  Practicing Med, educator all my life.  Contextual influences on patient's health include: Family Factors family conflict, Community Factors due to pandemic more isolated  and Economic Factors concerns if financial help is needed from children.    These factors will be addressed in the Preliminary Treatment plan. Patient identified their preferred language to be English. Patient reported they does not need the assistance of an  or other support involved in therapy.     Patient reported had no significant delays in developmental tasks.   Patient's highest education level was graduate school  .  Patient identified the following learning problems: none reported.  Modifications will not be used to assist communication in therapy.  Patient reports they are  able to understand written materials.    Patient's current relationship " "status is  for 51 years.   Patient identified their sexual orientation as heterosexual.  Patient reported having 2 child(norah). Patient identified adult child; spouse as part of their support system.  Patient has 2 adult children. They both in the [facility] area.     Patient's current living/housing situation involves staying in own home/apartment.  The immediate members of family and household include Edwin Barrett, 74, and they report that housing is stable.    Patient is currently employed part time.  Patient reports their finances are obtained through employment; other. Patient does identify finances as a current stressor. \"Not so much for me, but I am worried financially if my daughter gets a divorce. My  and I are fine, but if we have to help out with my daughter who has 3 kids. I worry about what will happen to my daughter and what we will be able to do to help them\"     Patient reported that they have not been involved with the legal system.    Patient does not report being under probation/ parole/ jurisdiction. They are not under any current court jurisdiction. .    Patient's Strengths and Limitations:  Patient identified the following strengths or resources that will help them succeed in treatment: , Latter-day / Rastafari, community involvement, friends / good social support, family support, intelligence and positive work environment. Things that may interfere with the patient's success in treatment include: none identified.     Personal and Family Medical History:  Patient does not report a family history of mental health concerns.  Patient reports family history includes Colon Polyps in her father; Fractures (age of onset: 83) in her mother; Osteoporosis in her maternal grandmother and mother; Uterine Cancer in her mother..     Patient does report Mental Health Diagnosis and/or Treatment.  Patient Patient reported the following previous diagnoses which include(s): none reported.  " Patient reported symptoms began June 2021.   Patient has not received mental health services in the past: none.  Psychiatric Hospitalizations: None.  Patient denies a history of civil commitment.  Patient is not receiving other mental health services.  These include none.         Patient has had a physical exam to rule out medical causes for current symptoms.  Date of last physical exam was within the past year. Symptoms have developed since last physical exam and client was encouraged to follow up with PCP.  . The patient has a [place] [facility] Primary Care Provider, who is named Damián Fowler.  Patient reports no current medical and/or dental concerns.  Patient denies any issues with pain..   There are not significant appetite / nutritional concerns / weight changes.   Patient does not report a history of head injury / trauma / cognitive impairment.      Patient reports current meds as:   No outpatient medications have been marked as taking for the 1/5/22 encounter ([facility] Extended Documentation) with Chante Goncalves.   Patient reports taking medications as prescribed, no psych medications.     Medication Adherence:  Patient reports not currently prescribed.  taking prescribed medications as prescribed.    Patient Allergies:  No Known Allergies    Medical History:    Past Medical History:   Diagnosis Date     Abnormal Pap smear of cervix 1978    HAD CRYO     Bursitis     doing PT and doing well     Genital warts      Herpes genitalis      Hyperlipidemia      Osteopenia 2018    Frax 22% dalila fracture. 11/18 Fosamax     Ovarian cyst     SIMPLE LEFT OVARIAN CYST 1.3 X 1.2 X 1.1     Pap smear of cervix not needed      Uterine fibroid 1/27/10    POST FIB 1.3 X 1.5 X 0.9 CM;SHILPA FIBROID 0.6 X 0.5 X 0.5 CM         Current Mental Status Exam:   Appearance:  Appropriate    Eye Contact:  Good   Psychomotor:  Normal       Gait / station:  Not able to assess due to video session   Attitude / Demeanor: Cooperative   Speech      Rate /  "Production: Normal/ Responsive      Volume:  Normal  volume      Language:  good  Mood:   Normal  Affect:   Appropriate    Thought Content: Clear   Thought Process: Logical       Associations: No loosening of associations  Insight:   Good   Judgment:  Intact   Orientation:  All  Attention/concentration: Good    Rating Scales:    PHQ9:    PHQ-9 SCORE 8/10/2020 10/4/2021 1/5/2022   PHQ-9 Total Score 2 2 7       GAD7:    DAVID-7 SCORE 8/10/2020 10/4/2021 12/30/2021   Total Score - - 12 (moderate anxiety)   Total Score 2 0 12     CGI:     First:Considering your total clinical experience with this particular patient population, how severe are the patient's symptoms at this time?: 2 (1/9/2022 10:05 AM)      Most recentCompared to the patient's condition at the START of treatment, this patient's condition is: 4 (1/9/2022 10:05 AM)      Substance Use:  Patient did not report a family history of substance use concerns; see medical history section for details.  Patient has not received chemical dependency treatment in the past.  Patient has not ever been to detox.      Patient is not currently receiving any chemical dependency treatment.           Substance History of use Age of first use Date of last use     Pattern and duration of use (include amounts and frequency)   Alcohol currently use   23 12/29/21 REPORTS SUBSTANCE USE: reports using substance 7 times per week and has 2 glass salomón at a time.   Patient reports heaviest use was \"always the same\".   Cannabis   never used     REPORTS SUBSTANCE USE: N/A     Amphetamines   never used     REPORTS SUBSTANCE USE: N/A   Cocaine/crack    never used       REPORTS SUBSTANCE USE: N/A   Hallucinogens never used         REPORTS SUBSTANCE USE: N/A   Inhalants never used         REPORTS SUBSTANCE USE: N/A   Heroin never used         REPORTS SUBSTANCE USE: N/A   Other Opiates never used     REPORTS SUBSTANCE USE: N/A   Benzodiazepine   never used     REPORTS SUBSTANCE USE: N/A "   Barbiturates never used     REPORTS SUBSTANCE USE: N/A   Over the counter meds never used     REPORTS SUBSTANCE USE: N/A   Caffeine currently use 14   REPORTS SUBSTANCE USE: reports using substance 4 times per day and has 4 coffee at a time.   Patient reports heaviest use is current use.   Nicotine  never used     REPORTS SUBSTANCE USE: N/A   Other substances not listed above:  Identify:  never used     REPORTS SUBSTANCE USE: N/A     Patient reported the following problems as a result of their substance use: no problems, not applicable.     CAGE- AID:    CAGE-AID Total Score 12/30/2021   Total Score 0   Total Score MyChart 0 (A total score of 2 or greater is considered clinically significant)       Substance Use: No symptoms    Based on the negative CAGE score and clinical interview there  are not indications of drug or alcohol abuse.      Significant Losses / Trauma / Abuse / Neglect Issues:   Patient did not serve in the .  There are indications or report of significant loss, trauma, abuse or neglect issues related to: are no indications and client denies any losses, trauma, abuse, or neglect concerns.  Concerns for possible neglect are not present.     Safety Assessment:   Current Safety Concerns:  [place] Suicide Severity Rating Scale (Lifetime/Recent)  [place] Suicide Severity Rating (Lifetime/Recent) 1/5/2022   1. Wish to be Dead (Lifetime) No   2. Non-Specific Active Suicidal Thoughts (Lifetime) No   Has subject engaged in non-suicidal self-injurious behavior? (Lifetime) No     Patient denies current homicidal ideation and behaviors.  Patient denies current self-injurious ideation and behaviors.    Patient denied risk behaviors associated with substance use.  Patient denies any high risk behaviors associated with mental health symptoms.  Patient reports the following current concerns for their personal safety: None.  Patient reports there are firearms in the house.       The firearms are secured  in a locked space.    History of Safety Concerns:  Patient denied a history of homicidal ideation.     Patient denied a history of personal safety concerns.    Patient denied a history of assaultive behaviors.    Patient denied a history of sexual assault behaviors.     Patient denied a history of risk behaviors associated with substance use.  Patient denies any history of high risk behaviors associated with mental health symptoms.  Patient reports the following protective factors: forward or future oriented thinking; dedication to family or friends; safe and stable environment; effectively controls impulses; regular physical activity; sense of belonging; purpose; secure attachment; living with other people; daily obligations; effective problem solving skills; commitment to well being; sense of meaning; positive social skills; healthy fear of risky behaviors or pain; financial stability; strong sense of self worth or esteem; sense of personal control or determination; access to a variety of clinical interventions and pets    Risk Plan:  See Recommendations for Safety and Risk Management Plan    Review of Symptoms per patient report:  Depression: Change in sleep, Lack of interest, Change in energy level, Change in appetite, Irritability and Feeling sad, down, or depressed  Julia:  No Symptoms  Psychosis: No Symptoms  Anxiety: Excessive worry, Nervousness, Physical complaints, such as headaches, stomachaches, muscle tension, Sleep disturbance and Irritability  Panic:  No symptoms  Post Traumatic Stress Disorder:  No Symptoms   Eating Disorder: No Symptoms  ADD / ADHD:  No symptoms  Conduct Disorder: No symptoms  Autism Spectrum Disorder: No symptoms  Obsessive Compulsive Disorder: No Symptoms    Patient reports the following compulsive behaviors and treatment history: none.      Diagnostic Criteria:   Adjustment Disorder  A. The development of emotional or behavioral symptoms in response to an identifiable stressor(s)  occurring within 3 months of the onset of the stressor(s)  B. These symptoms or behaviors are clinically significant, as evidenced by one or both of the following:       - Marked distress that is out of proportion to the severity/intensity of the stressor (with consideration for external context & culture)       - Significant impairment in social, occupational, or other important areas of functioning  C. The stress-related disturbance does not meet criteria for another disorder & is not not an exacerbation of another mental disorder  D. The symptoms do not represent normal bereavement  E. Once the stressor or its consequences have terminated, the symptoms do not persist for more than an additional 6 months       * Adjustment Disorder with Mixed Anxiety and Depressed Mood: The predominant manifestation is a combination of depression and anxiety    Functional Status:  Patient reports the following functional impairments: relationship(s) and social interactions.     WHODAS:   WHODAS 2.0 Total Score 12/30/2021   Total Score 17   Total Score MyChart 17     Nonprogrammatic care:  Patient is requesting basic services to address current mental health concerns.    Clinical Summary:  1. Reason for assessment:  depression and anxiety  2. Psychosocial, Cultural and Contextual Factors: Cultural influences and impact on patient's life structure, values, norms, and healthcare: White, middle class, [place]  Practicing Jainism, educator all my life.  Contextual influences on patient's health include: Family Factors family conflict, Community Factors due to pandemic more isolated  and Economic Factors concerns if financial help is needed from children.   3. Principal DSM5 Diagnoses  (Sustained by DSM5 Criteria Listed Above):   Adjustment Disorders  309.28 (F43.23) With mixed anxiety and depressed mood.  4. Other Diagnoses that is relevant to services:   deferred  5. Provisional  Diagnosis:  Deferred   6. Prognosis: Return to Normal Functioning.  7. Likely consequences of symptoms if not treated: Patient could decompensate and be in need of higher level of care.  8. Client strengths include:  caring, educated, insightful, intelligent and work history .     Recommendations:     1. Plan for Safety and Risk Management:   Recommended that patient call 911 or go to the local ED should there be a change in any of these risk factors..          Report to child / adult protection services was NA.     2. Patient's identified none.     3. Initial Treatment will focus on:    Adjustment Difficulties related to: family concerns.     4. Resources/Service Plan:    services are not indicated.   Modifications to assist communication are not indicated.   Additional disability accommodations are not indicated.      5. Collaboration:   Collaboration / coordination of treatment will be initiated with the following  support professionals: primary care physician.      6.  Referrals:   The following referral(s) will be initiated: Outpatient Mental Bill Therapy. Next Scheduled Appointment: 1/13/2022.     A Release of Information has been obtained for the following: verbal as needed.    7. GAVIN:    GAVIN:  Discussed the general effects of drugs and alcohol on health and well-being. Provider gave patient printed information about the effects of chemical use on their health and well being. Recommendations:  none .     8. Records:   These were reviewed at time of assessment.   Information in this assessment was obtained from the medical record and  provided by patient who is a good historian.    Patient will have open access to their mental health medical record.      Provider Name/ Credentials:  YECENIA Santamaria, DARCI  January 5, 2022

## 2022-01-05 NOTE — PROGRESS NOTES
"[facility]  Provider Name:  Chante Goncalves     Credentials:  YECENIA, DARCI    PATIENT'S NAME: Mima Barrett  PREFERRED NAME: Jennifer  PRONOUNS:   she, her    MRN: [id number]  : 1945  ADDRESS: [address] [place] [address]  ACCT. NUMBER:  [id number]  DATE OF SERVICE: 22  START TIME: 1000  END TIME: 1050  PREFERRED PHONE: [phone]  May we leave a program related message: Yes  SERVICE MODALITY:  Video Visit:      Provider verified identity through the following two step process.  Patient provided:  Patient  and Patient address    Telemedicine Visit: The patient's condition can be safely assessed and treated via synchronous audio and visual telemedicine encounter.      Reason for Telemedicine Visit: Patient has requested telehealth visit    Originating Site (Patient Location): Patient's home    Distant Site (Provider Location): Provider Remote Setting- Home Office    Consent:  The patient/guardian has verbally consented to: the potential risks and benefits of telemedicine (video visit) versus in person care; bill my insurance or make self-payment for services provided; and responsibility for payment of non-covered services.     Patient would like the video invitation sent by:  My Chart    Mode of Communication:  Video Conference via [employer]    As the provider I attest to compliance with applicable laws and regulations related to telemedicine.    UNIVERSAL ADULT Mental Health DIAGNOSTIC ASSESSMENT    Identifying Information:  Patient is a 76 year old,  .  The pronoun use throughout this assessment reflects the patient's chosen pronoun.  Patient was referred for an assessment by primary care provider.  Patient attended the session alone.    Chief Complaint:   The reason for seeking services at this time is: \"Difficult family situation dealing with probable daughter's divorce\" \"stress and anxiety) The problem(s) began 23.    Patient has not attempted to resolve " "these concerns in the past.    Social/Family History:  Patient reported [place].  They were raised by biological parents  .  Parents were always together \"\".  Patient reported that their childhood was \"very easy childhood, mom was a teacher and father was a superintendent. I have one brother, everyone was pretty much the same. Stress free and I consider it to be a happy childhood. No diversity.\"  Patient described their current relationships with family of origin as \"parents have both , they were a lot older when they had my brother and I. My brother is a doctor and we have a fair relationship not always honest. We may have been a little closer if he didn't move away in 9th grade for school. I think we are closer now than we have ever been. We talk about once a month and just now he retired.\"     The patient describes their cultural background as .  Cultural influences and impact on patient's life structure, values, norms, and healthcare: White, middle class, grew [place] in [place], after college lived urban.  Practicing Med, educator all my life.  Contextual influences on patient's health include: Family Factors family conflict, Community Factors due to pandemic more isolated  and Economic Factors concerns if financial help is needed from children.    These factors will be addressed in the Preliminary Treatment plan. Patient identified their preferred language to be English. Patient reported they does not need the assistance of an  or other support involved in therapy.     Patient reported had no significant delays in developmental tasks.   Patient's highest education level was graduate school  .  Patient identified the following learning problems: none reported.  Modifications will not be used to assist communication in therapy.  Patient reports they are  able to understand written materials.    Patient's current relationship " "status is  for 51 years.   Patient identified their sexual orientation as heterosexual.  Patient reported having 2 child(norah). Patient identified adult child; spouse as part of their support system.  Patient has 2 adult children. They both in the [facility] area.     Patient's current living/housing situation involves staying in own home/apartment.  The immediate members of family and household include Edwin Barrett, 74, and they report that housing is stable.    Patient is currently employed part time.  Patient reports their finances are obtained through employment; other. Patient does identify finances as a current stressor. \"Not so much for me, but I am worried financially if my daughter gets a divorce. My  and I are fine, but if we have to help out with my daughter who has 3 kids. I worry about what will happen to my daughter and what we will be able to do to help them\"     Patient reported that they have not been involved with the legal system.    Patient does not report being under probation/ parole/ jurisdiction. They are not under any current court jurisdiction. .    Patient's Strengths and Limitations:  Patient identified the following strengths or resources that will help them succeed in treatment: , Confucianist / Latter day, community involvement, friends / good social support, family support, intelligence and positive work environment. Things that may interfere with the patient's success in treatment include: none identified.     Personal and Family Medical History:  Patient does not report a family history of mental health concerns.  Patient reports family history includes Colon Polyps in her father; Fractures (age of onset: 83) in her mother; Osteoporosis in her maternal grandmother and mother; Uterine Cancer in her mother..     Patient does report Mental Health Diagnosis and/or Treatment.  Patient Patient reported the following previous diagnoses which include(s): none reported.  " Patient reported symptoms began June 2021.   Patient has not received mental health services in the past: none.  Psychiatric Hospitalizations: None.  Patient denies a history of civil commitment.  Patient is not receiving other mental health services.  These include none.         Patient has had a physical exam to rule out medical causes for current symptoms.  Date of last physical exam was within the past year. Symptoms have developed since last physical exam and client was encouraged to follow up with PCP.  . The patient has a [facility] Provider, who is named Damián Fowler.  Patient reports no current medical and/or dental concerns.  Patient denies any issues with pain..   There are not significant appetite / nutritional concerns / weight changes.   Patient does not report a history of head injury / trauma / cognitive impairment.      Patient reports current meds as:   No outpatient medications have been marked as taking for the 1/5/22 encounter ([facility] Extended Documentation) with Chante Goncalves.   Patient reports taking medications as prescribed, no psych medications.     Medication Adherence:  Patient reports not currently prescribed.  taking prescribed medications as prescribed.    Patient Allergies:  No Known Allergies    Medical History:    Past Medical History:   Diagnosis Date     Abnormal Pap smear of cervix 1978    HAD CRYO     Bursitis     doing PT and doing well     Genital warts      Herpes genitalis      Hyperlipidemia      Osteopenia 2018    Frax 22% dalila fracture. 11/18 Fosamax     Ovarian cyst     SIMPLE LEFT OVARIAN CYST 1.3 X 1.2 X 1.1     Pap smear of cervix not needed      Uterine fibroid 1/27/10    POST FIB 1.3 X 1.5 X 0.9 CM;SHILPA FIBROID 0.6 X 0.5 X 0.5 CM         Current Mental Status Exam:   Appearance:  Appropriate    Eye Contact:  Good   Psychomotor:  Normal       Gait / station:  Not able to assess due to video session   Attitude / Demeanor: Cooperative   Speech      Rate /  "Production: Normal/ Responsive      Volume:  Normal  volume      Language:  good  Mood:   Normal  Affect:   Appropriate    Thought Content: Clear   Thought Process: Logical       Associations: No loosening of associations  Insight:   Good   Judgment:  Intact   Orientation:  All  Attention/concentration: Good    Rating Scales:    PHQ9:    PHQ-9 SCORE 8/10/2020 10/4/2021 1/5/2022   PHQ-9 Total Score 2 2 7       GAD7:    DAVID-7 SCORE 8/10/2020 10/4/2021 12/30/2021   Total Score - - 12 (moderate anxiety)   Total Score 2 0 12     CGI:     First:Considering your total clinical experience with this particular patient population, how severe are the patient's symptoms at this time?: 2 (1/9/2022 10:05 AM)      Most recentCompared to the patient's condition at the START of treatment, this patient's condition is: 4 (1/9/2022 10:05 AM)      Substance Use:  Patient did not report a family history of substance use concerns; see medical history section for details.  Patient has not received chemical dependency treatment in the past.  Patient has not ever been to detox.      Patient is not currently receiving any chemical dependency treatment.           Substance History of use Age of first use Date of last use     Pattern and duration of use (include amounts and frequency)   Alcohol currently use   23 12/29/21 REPORTS SUBSTANCE USE: reports using substance 7 times per week and has 2 glass salomón at a time.   Patient reports heaviest use was \"always the same\".   Cannabis   never used     REPORTS SUBSTANCE USE: N/A     Amphetamines   never used     REPORTS SUBSTANCE USE: N/A   Cocaine/crack    never used       REPORTS SUBSTANCE USE: N/A   Hallucinogens never used         REPORTS SUBSTANCE USE: N/A   Inhalants never used         REPORTS SUBSTANCE USE: N/A   Heroin never used         REPORTS SUBSTANCE USE: N/A   Other Opiates never used     REPORTS SUBSTANCE USE: N/A   Benzodiazepine   never used     REPORTS SUBSTANCE USE: N/A "   Barbiturates never used     REPORTS SUBSTANCE USE: N/A   Over the counter meds never used     REPORTS SUBSTANCE USE: N/A   Caffeine currently use 14   REPORTS SUBSTANCE USE: reports using substance 4 times per day and has 4 coffee at a time.   Patient reports heaviest use is current use.   Nicotine  never used     REPORTS SUBSTANCE USE: N/A   Other substances not listed above:  Identify:  never used     REPORTS SUBSTANCE USE: N/A     Patient reported the following problems as a result of their substance use: no problems, not applicable.     CAGE- AID:    CAGE-AID Total Score 12/30/2021   Total Score 0   Total Score MyChart 0 (A total score of 2 or greater is considered clinically significant)       Substance Use: No symptoms    Based on the negative CAGE score and clinical interview there  are not indications of drug or alcohol abuse.      Significant Losses / Trauma / Abuse / Neglect Issues:   Patient did not serve in the .  There are indications or report of significant loss, trauma, abuse or neglect issues related to: are no indications and client denies any losses, trauma, abuse, or neglect concerns.  Concerns for possible neglect are not present.     Safety Assessment:   Current Safety Concerns:  [place] Suicide Severity Rating Scale (Lifetime/Recent)  [place] Suicide Severity Rating (Lifetime/Recent) 1/5/2022   1. Wish to be Dead (Lifetime) No   2. Non-Specific Active Suicidal Thoughts (Lifetime) No   Has subject engaged in non-suicidal self-injurious behavior? (Lifetime) No     Patient denies current homicidal ideation and behaviors.  Patient denies current self-injurious ideation and behaviors.    Patient denied risk behaviors associated with substance use.  Patient denies any high risk behaviors associated with mental health symptoms.  Patient reports the following current concerns for their personal safety: None.  Patient reports there are firearms in the house.       The firearms are secured  in a locked space.    History of Safety Concerns:  Patient denied a history of homicidal ideation.     Patient denied a history of personal safety concerns.    Patient denied a history of assaultive behaviors.    Patient denied a history of sexual assault behaviors.     Patient denied a history of risk behaviors associated with substance use.  Patient denies any history of high risk behaviors associated with mental health symptoms.  Patient reports the following protective factors: forward or future oriented thinking; dedication to family or friends; safe and stable environment; effectively controls impulses; regular physical activity; sense of belonging; purpose; secure attachment; living with other people; daily obligations; effective problem solving skills; commitment to well being; sense of meaning; positive social skills; healthy fear of risky behaviors or pain; financial stability; strong sense of self worth or esteem; sense of personal control or determination; access to a variety of clinical interventions and pets    Risk Plan:  See Recommendations for Safety and Risk Management Plan    Review of Symptoms per patient report:  Depression: Change in sleep, Lack of interest, Change in energy level, Change in appetite, Irritability and Feeling sad, down, or depressed  Julia:  No Symptoms  Psychosis: No Symptoms  Anxiety: Excessive worry, Nervousness, Physical complaints, such as headaches, stomachaches, muscle tension, Sleep disturbance and Irritability  Panic:  No symptoms  Post Traumatic Stress Disorder:  No Symptoms   Eating Disorder: No Symptoms  ADD / ADHD:  No symptoms  Conduct Disorder: No symptoms  Autism Spectrum Disorder: No symptoms  Obsessive Compulsive Disorder: No Symptoms    Patient reports the following compulsive behaviors and treatment history: none.      Diagnostic Criteria:   Adjustment Disorder  A. The development of emotional or behavioral symptoms in response to an identifiable stressor(s)  occurring within 3 months of the onset of the stressor(s)  B. These symptoms or behaviors are clinically significant, as evidenced by one or both of the following:       - Marked distress that is out of proportion to the severity/intensity of the stressor (with consideration for external context & culture)       - Significant impairment in social, occupational, or other important areas of functioning  C. The stress-related disturbance does not meet criteria for another disorder & is not not an exacerbation of another mental disorder  D. The symptoms do not represent normal bereavement  E. Once the stressor or its consequences have terminated, the symptoms do not persist for more than an additional 6 months       * Adjustment Disorder with Mixed Anxiety and Depressed Mood: The predominant manifestation is a combination of depression and anxiety    Functional Status:  Patient reports the following functional impairments: relationship(s) and social interactions.     WHODAS:   WHODAS 2.0 Total Score 12/30/2021   Total Score 17   Total Score MyChart 17     Nonprogrammatic care:  Patient is requesting basic services to address current mental health concerns.    Clinical Summary:  1. Reason for assessment:  depression and anxiety  2. Psychosocial, Cultural and Contextual Factors: Cultural influences and impact on patient's life structure, values, norms, and healthcare: White, middle class, [place]  Practicing Shinto, educator all my life.  Contextual influences on patient's health include: Family Factors family conflict, Community Factors due to pandemic more isolated  and Economic Factors concerns if financial help is needed from children.   3. Principal DSM5 Diagnoses  (Sustained by DSM5 Criteria Listed Above):   Adjustment Disorders  309.28 (F43.23) With mixed anxiety and depressed mood.  4. Other Diagnoses that is relevant to services:   deferred  5. Provisional  Diagnosis:  Deferred   6. Prognosis: Return to Normal Functioning.  7. Likely consequences of symptoms if not treated: Patient could decompensate and be in need of higher level of care.  8. Client strengths include:  caring, educated, insightful, intelligent and work history .     Recommendations:     1. Plan for Safety and Risk Management:   Recommended that patient call 911 or go to the local ED should there be a change in any of these risk factors..          Report to child / adult protection services was NA.     2. Patient's identified none.     3. Initial Treatment will focus on:    Adjustment Difficulties related to: family concerns.     4. Resources/Service Plan:    services are not indicated.   Modifications to assist communication are not indicated.   Additional disability accommodations are not indicated.      5. Collaboration:   Collaboration / coordination of treatment will be initiated with the following  support professionals: primary care physician.      6.  Referrals:   The following referral(s) will be initiated: Outpatient Mental Bill Therapy. Next Scheduled Appointment: 1/13/2022.     A Release of Information has been obtained for the following: verbal as needed.    7. GAVIN:    GAVIN:  Discussed the general effects of drugs and alcohol on health and well-being. Provider gave patient printed information about the effects of chemical use on their health and well being. Recommendations:  none .     8. Records:   These were reviewed at time of assessment.   Information in this assessment was obtained from the medical record and  provided by patient who is a good historian.    Patient will have open access to their mental health medical record.      Provider Name/ Credentials:  YECENIA Santamaria, COLLEEN  January 5, 2022    This note has been reviewed and I agree with the plan of care. This note is co-signed by Carlota Bush, [facility] Supervisor, on: January 13, 2022

## 2022-01-13 ENCOUNTER — VIRTUAL VISIT (OUTPATIENT)
Dept: PSYCHOLOGY | Facility: CLINIC | Age: 77
End: 2022-01-13
Payer: MEDICARE

## 2022-01-13 DIAGNOSIS — F43.23 ADJUSTMENT DISORDER WITH MIXED ANXIETY AND DEPRESSED MOOD: Primary | ICD-10-CM

## 2022-01-13 PROCEDURE — 90834 PSYTX W PT 45 MINUTES: CPT | Mod: 95

## 2022-01-13 NOTE — PROGRESS NOTES
Progress Note    Patient Name: Mima Barrett  Date: 2022         Service Type: Individual      Session Start Time: 900  Session End Time: 945     Session Length: 38-52    Session #: 2    Attendees: Client    Service Modality:  Video Visit:      Provider verified identity through the following two step process.  Patient provided:  Patient  and Patient is known previously to provider    Telemedicine Visit: The patient's condition can be safely assessed and treated via synchronous audio and visual telemedicine encounter.      Reason for Telemedicine Visit: Patient has requested telehealth visit    Originating Site (Patient Location): Patient's home    Distant Site (Provider Location): Provider Remote Setting- Home Office    Consent:  The patient/guardian has verbally consented to: the potential risks and benefits of telemedicine (video visit) versus in person care; bill my insurance or make self-payment for services provided; and responsibility for payment of non-covered services.     Patient would like the video invitation sent by:  My Chart    Mode of Communication:  Video Conference via Amwell    As the provider I attest to compliance with applicable laws and regulations related to telemedicine.     Treatment Plan Last Reviewed: 2022  PHQ-9 / DAVID-7 :   PHQ 8/10/2020 10/4/2021 2022   PHQ-9 Total Score 2 2 7   Q9: Thoughts of better off dead/self-harm past 2 weeks Not at all Not at all Not at all     DAVID-7 SCORE 8/10/2020 10/4/2021 2021   Total Score - - 12 (moderate anxiety)   Total Score 2 0 12         DATA  Interactive Complexity: No  Crisis: No       Progress Since Last Session (Related to Symptoms / Goals / Homework):   Symptoms: Worsening per PHQ-9 and DAVID-7    Homework: Achieved / completed to satisfaction      Episode of Care Goals: Minimal progress - PREPARATION (Decided to change - considering how); Intervened by negotiating a change  "plan and determining options / strategies for behavior change, identifying triggers, exploring social supports, and working towards setting a date to begin behavior change     Current / Ongoing Stressors and Concerns:   The reason for seeking services at this time is: \"Difficult family situation dealing with probable daughter's divorce\" \"stress and anxiety) The problem(s) began 06/20/21.     Patient has not attempted to resolve these concerns in the past.     Treatment Objective(s) Addressed in This Session:   Co-develop treatment plan  Consent to treatment plan     Intervention:   CBT: treatment planning   Therapist met with patient to review goals and interventions. Therapist utilized reflective listening as patient gave brief reflection of week. Patient reported stress and concerns are situational surrounding family friction with adult children. Therapist supported patient as she processed and validated patient emotions and concerns. She was engaged in session and questions. Patient's mood has been down/ depressed when she thinks about the family unit breaking up and potential financial stressors.  No safety concerns reported.       ASSESSMENT: Current Emotional / Mental Status (status of significant symptoms):   Risk status (Self / Other harm or suicidal ideation)   Patient denies current fears or concerns for personal safety.   Patient denies current or recent suicidal ideation or behaviors.   Patient denies current or recent homicidal ideation or behaviors.   Patient denies current or recent self injurious behavior or ideation.   Patient denies other safety concerns.   Patient reports there has been no change in risk factors since their last session.     Patient reports there has been no change in protective factors since their last session.     Recommended that patient call 911 or go to the local ED should there be a change in any of these risk factors.     Appearance:   Appropriate    Eye Contact:   Good "    Psychomotor Behavior: Normal    Attitude:   Cooperative    Orientation:   All   Speech    Rate / Production: Normal/ Responsive Normal     Volume:  Normal    Mood:    Normal   Affect:    Appropriate    Thought Content:  Clear    Thought Form:  Coherent  Logical    Insight:    Good      Medication Review:   No current psychiatric medications prescribed     Medication Compliance:   Yes     Changes in Health Issues:   None reported     Chemical Use Review:   Substance Use: Chemical use reviewed, no active concerns identified      Tobacco Use: No current tobacco use.      Diagnosis:  1. Adjustment disorder with mixed anxiety and depressed mood        Collateral Reports Completed:   Not Applicable    PLAN: (Patient Tasks / Therapist Tasks / Other)  Patient will work on self care and do something for self that brings joys. Patient will attend next scheduled session to address anxiety and depression.         Chante KEENE, SW    This note has been reviewed and I agree with the plan of care. This note is co-signed by UBALDO Jamison Supervisor, on: January 20, 2022                                                        ______________________________________________________________________    Treatment Plan    Patient's Name: Mima Barrett  YOB: 1945    Date: 1/13/2022    DSM5 Diagnoses: Adjustment Disorders  309.28 (F43.23) With mixed anxiety and depressed mood  Psychosocial / Contextual Factors: family friction   WHODAS:     Referral / Collaboration:  Referral to another professional/service is not indicated at this time..    Anticipated number of session or this episode of care: Review in 90 days      MeasurableTreatment Goal(s) related to diagnosis / functional impairment(s)  Goal 1: Patient will gain skills and education to manage depression in a healthy manner, as measured by PHQ-9.    I will know I've met my goal when I feel rested in the morning, not fatigued, and receive 7-8  hours of sleep,       Objective #A (Patient Action)    Patient will identify 3-5 stressors which contribute to feelings of depression.  Status: New - Date: 1/13/2022     Intervention(s)  Therapist will assign homework identifying feelings of depression.    Objective #B  Patient will identify 3-5 strategies to more effectively address stressors.  Status: New - Date: 1/13/2022     Intervention(s)  Therapist will teach healthy coping skills.    Objective #C  Patient will participate in 3-5 activities to improve mood.  Status: New - Date: 1/13/2022     Intervention(s)  Therapist will assign homework involving activities to improve mood.      Goal 2: Patient will manage anxiety in healthy ways measured by DAVID-7.     I will know I've met my goal when my anxious thoughts no longer consumes my day      Objective #A (Patient Action)    Status: New - Date: 1/13/2022     Patient will Identify negative self-talk and behaviors: challenge core beliefs, myths, and actions.    Intervention(s)  Therapist will teach positive self talk.    Objective #B  Patient will identify 3-5 stressors which contribute to feelings of anxiety.    Status: New - Date: 1/13/2022     Intervention(s)  Therapist will assign homework identifying anxious thoughts/ fears .    Objective #C  Patient will use at least 3-5 coping skills for anxiety management in the next 5 weeks.  Status: New - Date: 1/13/2022     Intervention(s)  Therapist will teach emotional regulation skills. Anxiety coping mechanisms .        Chante KEENE, SW   January 13, 2022    This note has been reviewed and I agree with the treatment plan of care. This note is co-signed by Carlota Bush [facility] Supervisor, on: January 20, 2022

## 2022-01-19 ENCOUNTER — VIRTUAL VISIT (OUTPATIENT)
Dept: PSYCHOLOGY | Facility: CLINIC | Age: 77
End: 2022-01-19
Payer: MEDICARE

## 2022-01-19 DIAGNOSIS — F43.23 ADJUSTMENT DISORDER WITH MIXED ANXIETY AND DEPRESSED MOOD: Primary | ICD-10-CM

## 2022-01-19 PROCEDURE — 90834 PSYTX W PT 45 MINUTES: CPT | Mod: 95

## 2022-01-19 NOTE — PROGRESS NOTES
Progress Note    Patient Name: Mima Barrett  Date: 2022         Service Type: Individual      Session Start Time: 900  Session End Time: 945     Session Length: 38-52    Session #: 3    Attendees: Client    Service Modality:  Video Visit:      Provider verified identity through the following two step process.  Patient provided:  Patient  and Patient is known previously to provider    Telemedicine Visit: The patient's condition can be safely assessed and treated via synchronous audio and visual telemedicine encounter.      Reason for Telemedicine Visit: Patient has requested telehealth visit    Originating Site (Patient Location): Patient's home    Distant Site (Provider Location): Provider Remote Setting- Home Office    Consent:  The patient/guardian has verbally consented to: the potential risks and benefits of telemedicine (video visit) versus in person care; bill my insurance or make self-payment for services provided; and responsibility for payment of non-covered services.     Patient would like the video invitation sent by:  My Chart    Mode of Communication:  Video Conference via Amwell    As the provider I attest to compliance with applicable laws and regulations related to telemedicine.     Treatment Plan Last Reviewed: 2022  PHQ-9 / DVAID-7 :   PHQ 8/10/2020 10/4/2021 2022   PHQ-9 Total Score 2 2 7   Q9: Thoughts of better off dead/self-harm past 2 weeks Not at all Not at all Not at all     DAVID-7 SCORE 8/10/2020 10/4/2021 2021   Total Score - - 12 (moderate anxiety)   Total Score 2 0 12         DATA  Interactive Complexity: No  Crisis: No       Progress Since Last Session (Related to Symptoms / Goals / Homework):   Symptoms: No change no changes in symptoms    Homework: Achieved / completed to satisfaction      Episode of Care Goals: Minimal progress - PREPARATION (Decided to change - considering how); Intervened by negotiating a change  "plan and determining options / strategies for behavior change, identifying triggers, exploring social supports, and working towards setting a date to begin behavior change     Current / Ongoing Stressors and Concerns:   The reason for seeking services at this time is: \"Difficult family situation dealing with probable daughter's divorce\" \"stress and anxiety) The problem(s) began 06/20/21.     Patient has not attempted to resolve these concerns in the past.     Treatment Objective(s) Addressed in This Session:   Decrease frequency and intensity of feeling down, depressed, hopeless     Intervention:   CBT: mood instability    Therapist met with patient to review goals and interventions. Therapist utilized reflective listening as patient gave brief reflection of week. Patient reported family discord over the weekend, which she was brought into and was stressful and \"heartbreaking\". She states she had difficulty sleeping and worries about her grandchildren and their exposure to family drama.  Therapist supported patient as she processed her feelings and validated patient emotions and concerns. Therapist discussed boundaries and impact of unhealthy boundaries to mental wellbeing. Therapist reviewed transition of roles as parents as needs change for adult children. Therapist discussed patient needs and what that looks like as a boundary.  No safety concerns reported.       ASSESSMENT: Current Emotional / Mental Status (status of significant symptoms):   Risk status (Self / Other harm or suicidal ideation)   Patient denies current fears or concerns for personal safety.   Patient denies current or recent suicidal ideation or behaviors.   Patient denies current or recent homicidal ideation or behaviors.   Patient denies current or recent self injurious behavior or ideation.   Patient denies other safety concerns.   Patient reports there has been no change in risk factors since their last session.     Patient reports there has " been no change in protective factors since their last session.     Recommended that patient call 911 or go to the local ED should there be a change in any of these risk factors.     Appearance:   Appropriate    Eye Contact:   Good    Psychomotor Behavior: Normal    Attitude:   Cooperative    Orientation:   All   Speech    Rate / Production: Normal/ Responsive Normal     Volume:  Normal    Mood:    Depressed  Sad    Affect:    Tearful Worrisome    Thought Content:  Clear    Thought Form:  Coherent  Logical    Insight:    Good      Medication Review:   No current psychiatric medications prescribed     Medication Compliance:   Yes     Changes in Health Issues:   None reported     Chemical Use Review:   Substance Use: Chemical use reviewed, no active concerns identified      Tobacco Use: No current tobacco use.      Diagnosis:  1. Adjustment disorder with mixed anxiety and depressed mood        Collateral Reports Completed:   Not Applicable    PLAN: (Patient Tasks / Therapist Tasks / Other)  Patient will bring to session list of boundaries needed to feel safe and supported as a mother and grandparent. Patient will attend family events this weekend, by staying present in the moment.         DARCI Vega    This note has been reviewed and I agree with the plan of care. This note is co-signed by [facility], on: January 27, 2022                                                        ______________________________________________________________________    Treatment Plan    Patient's Name: Mima Barrett  YOB: 1945    Date: 1/13/2022    DSM5 Diagnoses: Adjustment Disorders  309.28 (F43.23) With mixed anxiety and depressed mood  Psychosocial / Contextual Factors: family friction   WHODAS:     Referral / Collaboration:  Referral to another professional/service is not indicated at this time..    Anticipated number of session or this episode of care: Review in 90  days      MeasurableTreatment Goal(s) related to diagnosis / functional impairment(s)  Goal 1: Patient will gain skills and education to manage depression in a healthy manner, as measured by PHQ-9.    I will know I've met my goal when I feel rested in the morning, not fatigued, and receive 7-8 hours of sleep,       Objective #A (Patient Action)    Patient will identify 3-5 stressors which contribute to feelings of depression.  Status: New - Date: 1/13/2022     Intervention(s)  Therapist will assign homework identifying feelings of depression.    Objective #B  Patient will identify 3-5 strategies to more effectively address stressors.  Status: New - Date: 1/13/2022     Intervention(s)  Therapist will teach healthy coping skills.    Objective #C  Patient will participate in 3-5 activities to improve mood.  Status: New - Date: 1/13/2022     Intervention(s)  Therapist will assign homework involving activities to improve mood.      Goal 2: Patient will manage anxiety in healthy ways measured by DAVID-7.     I will know I've met my goal when my anxious thoughts no longer consumes my day      Objective #A (Patient Action)    Status: New - Date: 1/13/2022     Patient will Identify negative self-talk and behaviors: challenge core beliefs, myths, and actions.    Intervention(s)  Therapist will teach positive self talk.    Objective #B  Patient will identify 3-5 stressors which contribute to feelings of anxiety.    Status: New - Date: 1/13/2022     Intervention(s)  Therapist will assign homework identifying anxious thoughts/ fears .    Objective #C  Patient will use at least 3-5 coping skills for anxiety management in the next 5 weeks.  Status: New - Date: 1/13/2022     Intervention(s)  Therapist will teach emotional regulation skills. Anxiety coping mechanisms .        Chante Goncalves MSW, LGSW   January 13, 2022  This note has been reviewed and I agree with the treatment plan of care. This note is co-signed by Carlota  UBALDO Bush Supervisor, on: January 27, 2022

## 2022-01-28 ENCOUNTER — VIRTUAL VISIT (OUTPATIENT)
Dept: PSYCHOLOGY | Facility: CLINIC | Age: 77
End: 2022-01-28
Payer: MEDICARE

## 2022-01-28 DIAGNOSIS — F43.23 ADJUSTMENT DISORDER WITH MIXED ANXIETY AND DEPRESSED MOOD: Primary | ICD-10-CM

## 2022-01-28 PROCEDURE — 90834 PSYTX W PT 45 MINUTES: CPT | Mod: 95

## 2022-01-28 NOTE — PROGRESS NOTES
Progress Note    Patient Name: Mima Barrett  Date: 2022         Service Type: Individual      Session Start Time: 900  Session End Time: 945     Session Length: 38-52    Session #: 4    Attendees: Client    Service Modality:  Video Visit:      Provider verified identity through the following two step process.  Patient provided:  Patient  and Patient is known previously to provider    Telemedicine Visit: The patient's condition can be safely assessed and treated via synchronous audio and visual telemedicine encounter.      Reason for Telemedicine Visit: Patient has requested telehealth visit    Originating Site (Patient Location): Patient's home    Distant Site (Provider Location): Provider Remote Setting- Home Office    Consent:  The patient/guardian has verbally consented to: the potential risks and benefits of telemedicine (video visit) versus in person care; bill my insurance or make self-payment for services provided; and responsibility for payment of non-covered services.     Patient would like the video invitation sent by:  My Chart    Mode of Communication:  Video Conference via Amwell    As the provider I attest to compliance with applicable laws and regulations related to telemedicine.     Treatment Plan Last Reviewed: 2022  PHQ-9 / DAVID-7 :   PHQ 8/10/2020 10/4/2021 2022   PHQ-9 Total Score 2 2 7   Q9: Thoughts of better off dead/self-harm past 2 weeks Not at all Not at all Not at all     DAVID-7 SCORE 8/10/2020 10/4/2021 2021   Total Score - - 12 (moderate anxiety)   Total Score 2 0 12         DATA  Interactive Complexity: No  Crisis: No       Progress Since Last Session (Related to Symptoms / Goals / Homework):   Symptoms: Improving depressive symptoms decreased per patient report, less crying     Homework: Achieved / completed to satisfaction      Episode of Care Goals: Satisfactory progress - ACTION (Actively working towards  "change); Intervened by reinforcing change plan / affirming steps taken     Current / Ongoing Stressors and Concerns:   The reason for seeking services at this time is: \"Difficult family situation dealing with probable daughter's divorce\" \"stress and anxiety) The problem(s) began 06/20/21.     Patient has not attempted to resolve these concerns in the past.     Treatment Objective(s) Addressed in This Session:   identify 1-2 fears / thoughts that contribute to feeling anxious     Intervention:   CBT: managing anxious thoughts   Therapist met with patient to review goals and interventions. Therapist utilized reflective listening as patient gave brief reflection of week. Patient reported a more peaceful mood and less tearful. She has been using her journal to transfer worries and leaving them there for stress management. She will discuss boundaries with daughter and weekend plans. Therapist discussed anxious thoughts surrounding text messages and phone calls which have been anxiety provoking. Therapist encouraged boundaries and setting limits on communication which involves adult children marriage concerns which have been emotionally exhausting for her. Sleep is better. Therapist gave recommendation of Walk in counseling center that offers online free therapy for patient's family in need of support. Patient was thankful and requested additional literature for grandparents grieving adult children divorce.  No safety concerns reported.       ASSESSMENT: Current Emotional / Mental Status (status of significant symptoms):   Risk status (Self / Other harm or suicidal ideation)   Patient denies current fears or concerns for personal safety.   Patient denies current or recent suicidal ideation or behaviors.   Patient denies current or recent homicidal ideation or behaviors.   Patient denies current or recent self injurious behavior or ideation.   Patient denies other safety concerns.   Patient reports there has been no change " in risk factors since their last session.     Patient reports there has been no change in protective factors since their last session.     Recommended that patient call 911 or go to the local ED should there be a change in any of these risk factors.     Appearance:   Appropriate    Eye Contact:   Good    Psychomotor Behavior: Normal    Attitude:   Cooperative    Orientation:   All   Speech    Rate / Production: Normal/ Responsive Normal     Volume:  Normal    Mood:    Sad    Affect:    Worrisome    Thought Content:  Clear    Thought Form:  Coherent  Logical    Insight:    Good      Medication Review:   No current psychiatric medications prescribed     Medication Compliance:   Yes     Changes in Health Issues:   None reported     Chemical Use Review:   Substance Use: Chemical use reviewed, no active concerns identified      Tobacco Use: No current tobacco use.      Diagnosis:  1. Adjustment disorder with mixed anxiety and depressed mood        Collateral Reports Completed:   Not Applicable    PLAN: (Patient Tasks / Therapist Tasks / Other)  Patient will communicate expectations with daughter and provide walk in counseling information for grandchildren.          Chante KEENE, SW    This note has been reviewed and I agree with the plan of care. This note is co-signed by Carlota Bush [facility] Supervisor, on: February 3, 2022                                                        ______________________________________________________________________    Treatment Plan    Patient's Name: Mima Barrett  YOB: 1945    Date: 1/13/2022    DSM5 Diagnoses: Adjustment Disorders  309.28 (F43.23) With mixed anxiety and depressed mood  Psychosocial / Contextual Factors: family friction   WHODAS:     Referral / Collaboration:  Referral to another professional/service is not indicated at this time..    Anticipated number of session or this episode of care: Review in 90 days      MeasurableTreatment  Goal(s) related to diagnosis / functional impairment(s)  Goal 1: Patient will gain skills and education to manage depression in a healthy manner, as measured by PHQ-9.    I will know I've met my goal when I feel rested in the morning, not fatigued, and receive 7-8 hours of sleep,       Objective #A (Patient Action)    Patient will identify 3-5 stressors which contribute to feelings of depression.  Status: New - Date: 1/13/2022     Intervention(s)  Therapist will assign homework identifying feelings of depression.    Objective #B  Patient will identify 3-5 strategies to more effectively address stressors.  Status: New - Date: 1/13/2022     Intervention(s)  Therapist will teach healthy coping skills.    Objective #C  Patient will participate in 3-5 activities to improve mood.  Status: New - Date: 1/13/2022     Intervention(s)  Therapist will assign homework involving activities to improve mood.      Goal 2: Patient will manage anxiety in healthy ways measured by DAVID-7.     I will know I've met my goal when my anxious thoughts no longer consumes my day      Objective #A (Patient Action)    Status: New - Date: 1/13/2022     Patient will Identify negative self-talk and behaviors: challenge core beliefs, myths, and actions.    Intervention(s)  Therapist will teach positive self talk.    Objective #B  Patient will identify 3-5 stressors which contribute to feelings of anxiety.    Status: New - Date: 1/13/2022     Intervention(s)  Therapist will assign homework identifying anxious thoughts/ fears .    Objective #C  Patient will use at least 3-5 coping skills for anxiety management in the next 5 weeks.  Status: New - Date: 1/13/2022     Intervention(s)  Therapist will teach emotional regulation skills. Anxiety coping mechanisms .        Chante Goncalves MSAMBERLY, SW   January 13, 2022  This note has been reviewed and I agree with the treatment plan of care. This note is co-signed by Carlota Bush [facility] Supervisor, on:  January 27, 2022

## 2022-02-04 ENCOUNTER — VIRTUAL VISIT (OUTPATIENT)
Dept: PSYCHOLOGY | Facility: CLINIC | Age: 77
End: 2022-02-04
Payer: MEDICARE

## 2022-02-04 DIAGNOSIS — F43.23 ADJUSTMENT DISORDER WITH MIXED ANXIETY AND DEPRESSED MOOD: Primary | ICD-10-CM

## 2022-02-04 PROCEDURE — 90834 PSYTX W PT 45 MINUTES: CPT | Mod: 95

## 2022-02-04 ASSESSMENT — ANXIETY QUESTIONNAIRES
3. WORRYING TOO MUCH ABOUT DIFFERENT THINGS: NEARLY EVERY DAY
6. BECOMING EASILY ANNOYED OR IRRITABLE: NOT AT ALL
5. BEING SO RESTLESS THAT IT IS HARD TO SIT STILL: NOT AT ALL
7. FEELING AFRAID AS IF SOMETHING AWFUL MIGHT HAPPEN: SEVERAL DAYS
GAD7 TOTAL SCORE: 8
4. TROUBLE RELAXING: NOT AT ALL
2. NOT BEING ABLE TO STOP OR CONTROL WORRYING: MORE THAN HALF THE DAYS
1. FEELING NERVOUS, ANXIOUS, OR ON EDGE: MORE THAN HALF THE DAYS

## 2022-02-04 ASSESSMENT — PATIENT HEALTH QUESTIONNAIRE - PHQ9: SUM OF ALL RESPONSES TO PHQ QUESTIONS 1-9: 4

## 2022-02-04 NOTE — PROGRESS NOTES
Progress Note    Patient Name: Mima Barrett  Date: 2022         Service Type: Individual      Session Start Time: 900  Session End Time: 945     Session Length: 38-52    Session #: 5    Attendees: Client    Service Modality:  Video Visit:      Provider verified identity through the following two step process.  Patient provided:  Patient  and Patient is known previously to provider    Telemedicine Visit: The patient's condition can be safely assessed and treated via synchronous audio and visual telemedicine encounter.      Reason for Telemedicine Visit: Patient has requested telehealth visit    Originating Site (Patient Location): Patient's home    Distant Site (Provider Location): Provider Remote Setting- Home Office    Consent:  The patient/guardian has verbally consented to: the potential risks and benefits of telemedicine (video visit) versus in person care; bill my insurance or make self-payment for services provided; and responsibility for payment of non-covered services.     Patient would like the video invitation sent by:  My Chart    Mode of Communication:  Video Conference via Amwell    As the provider I attest to compliance with applicable laws and regulations related to telemedicine.     Treatment Plan Last Reviewed: 2022  PHQ-9 / DAVID-7 :     DAVID-7 SCORE 10/4/2021 2021 2022   Total Score - 12 (moderate anxiety) -   Total Score 0 12 8     PHQ 10/4/2021 2022 2022   PHQ-9 Total Score 2 7 4   Q9: Thoughts of better off dead/self-harm past 2 weeks Not at all Not at all Not at all         DATA  Interactive Complexity: No  Crisis: No       Progress Since Last Session (Related to Symptoms / Goals / Homework):   Symptoms: Improving per DAVID-7 and PHQ-9    Homework: Achieved / completed to satisfaction      Episode of Care Goals: Satisfactory progress - ACTION (Actively working towards change); Intervened by reinforcing change plan /  "affirming steps taken     Current / Ongoing Stressors and Concerns:   The reason for seeking services at this time is: \"Difficult family situation dealing with probable daughter's divorce\" \"stress and anxiety) The problem(s) began 06/20/21.     Patient has not attempted to resolve these concerns in the past.     Treatment Objective(s) Addressed in This Session:   Decrease frequency and intensity of feeling down, depressed, hopeless     Intervention:   CBT: stress management   Therapist met with patient to review goals and interventions. Therapist utilized reflective listening as patient gave brief reflection of week. Patient reported sleep has improved, more energy throughout the day and feels that she has honored her boundaries and has been using I statements to help communicate her needs. Therapist discussed happiness chemicals. Therapist provided psychoeducation on serotonin, mood stabilizer and how to hack into them.  Patient reports she not been exercising lately. Therapist and patient discussed healthy coping skills discussed in session to use for depression and anxiety management. Patient reported feels that therapy has been helpful and she has received the tools needed for mental wellbeing. Patient will follow up in 1 month.      ASSESSMENT: Current Emotional / Mental Status (status of significant symptoms):   Risk status (Self / Other harm or suicidal ideation)   Patient denies current fears or concerns for personal safety.   Patient denies current or recent suicidal ideation or behaviors.   Patient denies current or recent homicidal ideation or behaviors.   Patient denies current or recent self injurious behavior or ideation.   Patient denies other safety concerns.   Patient reports there has been no change in risk factors since their last session.     Patient reports there has been no change in protective factors since their last session.     Recommended that patient call 911 or go to the local ED should " there be a change in any of these risk factors.     Appearance:   Appropriate    Eye Contact:   Good    Psychomotor Behavior: Normal    Attitude:   Cooperative    Orientation:   All   Speech    Rate / Production: Normal/ Responsive Normal     Volume:  Normal    Mood:    Normal   Affect:    Appropriate  Worrisome    Thought Content:  Clear    Thought Form:  Coherent  Logical    Insight:    Good      Medication Review:   No current psychiatric medications prescribed     Medication Compliance:   Yes     Changes in Health Issues:   None reported     Chemical Use Review:   Substance Use: Chemical use reviewed, no active concerns identified      Tobacco Use: No current tobacco use.      Diagnosis:  1. Adjustment disorder with mixed anxiety and depressed mood        Collateral Reports Completed:   Not Applicable    PLAN: (Patient Tasks / Therapist Tasks / Other)  2/04/2022: Patient to exercise 2-3 per week.    1/28/2022: Patient will communicate expectations with daughter and provide walk in counseling information for grandchildren.          Chante KEENE, RADHASW       This note has been reviewed and I agree with the plan of care. This note is co-signed by UBALDO Jamison Supervisor, on: February 10, 2022                                                       ______________________________________________________________________    Treatment Plan    Patient's Name: Mima Barrett  YOB: 1945    Date: 1/13/2022    DSM5 Diagnoses: Adjustment Disorders  309.28 (F43.23) With mixed anxiety and depressed mood  Psychosocial / Contextual Factors: family friction   WHODAS:     Referral / Collaboration:  Referral to another professional/service is not indicated at this time..    Anticipated number of session or this episode of care: Review in 90 days      MeasurableTreatment Goal(s) related to diagnosis / functional impairment(s)  Goal 1: Patient will gain skills and education to manage depression in a  healthy manner, as measured by PHQ-9.    I will know I've met my goal when I feel rested in the morning, not fatigued, and receive 7-8 hours of sleep,       Objective #A (Patient Action)    Patient will identify 3-5 stressors which contribute to feelings of depression.  Status: New - Date: 1/13/2022     Intervention(s)  Therapist will assign homework identifying feelings of depression.    Objective #B  Patient will identify 3-5 strategies to more effectively address stressors.  Status: New - Date: 1/13/2022     Intervention(s)  Therapist will teach healthy coping skills.    Objective #C  Patient will participate in 3-5 activities to improve mood.  Status: New - Date: 1/13/2022     Intervention(s)  Therapist will assign homework involving activities to improve mood.      Goal 2: Patient will manage anxiety in healthy ways measured by DAVID-7.     I will know I've met my goal when my anxious thoughts no longer consumes my day      Objective #A (Patient Action)    Status: New - Date: 1/13/2022     Patient will Identify negative self-talk and behaviors: challenge core beliefs, myths, and actions.    Intervention(s)  Therapist will teach positive self talk.    Objective #B  Patient will identify 3-5 stressors which contribute to feelings of anxiety.    Status: New - Date: 1/13/2022     Intervention(s)  Therapist will assign homework identifying anxious thoughts/ fears .    Objective #C  Patient will use at least 3-5 coping skills for anxiety management in the next 5 weeks.  Status: New - Date: 1/13/2022     Intervention(s)  Therapist will teach emotional regulation skills. Anxiety coping mechanisms .        Chante KEENE, COLLEEN   January 13, 2022  This note has been reviewed and I agree with the treatment plan of care. This note is co-signed by Carlota Bush [facility] Supervisor, on: January 27, 2022

## 2022-02-05 ASSESSMENT — ANXIETY QUESTIONNAIRES: GAD7 TOTAL SCORE: 8

## 2022-03-04 ENCOUNTER — VIRTUAL VISIT (OUTPATIENT)
Dept: PSYCHOLOGY | Facility: CLINIC | Age: 77
End: 2022-03-04
Payer: MEDICARE

## 2022-03-04 DIAGNOSIS — F43.23 ADJUSTMENT DISORDER WITH MIXED ANXIETY AND DEPRESSED MOOD: Primary | ICD-10-CM

## 2022-03-04 PROCEDURE — 90834 PSYTX W PT 45 MINUTES: CPT | Mod: 95

## 2022-03-04 ASSESSMENT — ANXIETY QUESTIONNAIRES
6. BECOMING EASILY ANNOYED OR IRRITABLE: NOT AT ALL
2. NOT BEING ABLE TO STOP OR CONTROL WORRYING: MORE THAN HALF THE DAYS
7. FEELING AFRAID AS IF SOMETHING AWFUL MIGHT HAPPEN: MORE THAN HALF THE DAYS
4. TROUBLE RELAXING: SEVERAL DAYS
GAD7 TOTAL SCORE: 9
3. WORRYING TOO MUCH ABOUT DIFFERENT THINGS: MORE THAN HALF THE DAYS
1. FEELING NERVOUS, ANXIOUS, OR ON EDGE: MORE THAN HALF THE DAYS
5. BEING SO RESTLESS THAT IT IS HARD TO SIT STILL: NOT AT ALL

## 2022-03-04 ASSESSMENT — PATIENT HEALTH QUESTIONNAIRE - PHQ9: SUM OF ALL RESPONSES TO PHQ QUESTIONS 1-9: 4

## 2022-03-04 NOTE — PROGRESS NOTES
"[facility]                                     Progress Note    Patient Name: Mima Barrett  Date: 3/04/2022         Service Type: Individual      Session Start Time: 900  Session End Time: 950     Session Length: 38-52    Session #: 6    Attendees: Client attended alone    Service Modality:  Video Visit:      Provider verified identity through the following two step process.  Patient provided:  Patient  and Patient is known previously to provider    Telemedicine Visit: The patient's condition can be safely assessed and treated via synchronous audio and visual telemedicine encounter.      Reason for Telemedicine Visit: Patient has requested telehealth visit    Originating Site (Patient Location): Patient's home    Distant Site (Provider Location): Provider Remote Setting- Home Office    Consent:  The patient/guardian has verbally consented to: the potential risks and benefits of telemedicine (video visit) versus in person care; bill my insurance or make self-payment for services provided; and responsibility for payment of non-covered services.     Patient would like the video invitation sent by:  My Chart    Mode of Communication:  Video Conference via Amwell    As the provider I attest to compliance with applicable laws and regulations related to telemedicine.    DATA  Interactive Complexity: No  Crisis: No        Progress Since Last Session (Related to Symptoms / Goals / Homework):   Symptoms: Worsening per DAVID-7    Homework: Achieved / completed to satisfaction      Episode of Care Goals: Minimal progress - ACTION (Actively working towards change); Intervened by reinforcing change plan / affirming steps taken     Current / Ongoing Stressors and Concerns:   The reason for seeking services at this time is: \"Difficult family situation dealing with probable daughter's divorce\" \"stress and anxiety) The problem(s) began 21.     Patient has not attempted to resolve these concerns in " the past.     Treatment Objective(s) Addressed in This Session:   use at least 2 coping skills for anxiety management in the next 3 weeks       Intervention:   CBT: Met with patient to review goals and interventions. Provided active listening as patient discussed family concerns. Reviewed cognitive distortions. Discussed overcoming cognitive distortions with reframing. Introduced thought stopping strategy What could happen vs. What will happen and provided example.      Assessments completed prior to visit:  The following assessments were completed by patient for this visit:  PHQ2:   PHQ-2 ( 1999 [employer]) 1/13/2022 1/5/2022 10/4/2021 8/10/2020 1/29/2016   Q1: Little interest or pleasure in doing things 1 1 0 1 0   Q2: Feeling down, depressed or hopeless 1 1 0 1 0   PHQ-2 Score 2 2 0 2 0   PHQ-2 Total Score (12-17 Years)- Positive if 3 or more points; Administer PHQ-A if positive - - 0 2 -   Q1: Little interest or pleasure in doing things Several days Several days - - -   Q2: Feeling down, depressed or hopeless Several days Several days - - -   PHQ-2 Score 2 2 - - -     PHQ9:   PHQ-9 SCORE 8/2/2018 8/8/2019 8/10/2020 10/4/2021 1/5/2022 2/4/2022 3/4/2022   PHQ-9 Total Score 1 0 2 2 7 4 4     GAD2:   DAVID-2 12/30/2021 1/11/2022   Feeling nervous, anxious, or on edge 3 2   Not being able to stop or control worrying 3 2   DAVID-2 Total Score 6 4     GAD7:   DAVID-7 SCORE 8/2/2018 8/8/2019 8/10/2020 10/4/2021 12/30/2021 2/4/2022 3/4/2022   Total Score - - - - 12 (moderate anxiety) - -   Total Score 0 0 2 0 12 8 9     CAGE-AID:   CAGE-AID Total Score 12/30/2021   Total Score 0   Total Score MyChart 0 (A total score of 2 or greater is considered clinically significant)     PROMIS 10-Global Health (all questions and answers displayed):   PROMIS 10 12/30/2021 1/11/2022   In general, would you say your health is: Very good Very good   In general, would you say your quality of life is: Excellent Very good   In general, how would you  rate your physical health? Very good Very good   In general, how would you rate your mental health, including your mood and your ability to think? Fair Good   In general, how would you rate your satisfaction with your social activities and relationships? Good Good   In general, please rate how well you carry out your usual social activities and roles Very good Fair   To what extent are you able to carry out your everyday physical activities such as walking, climbing stairs, carrying groceries, or moving a chair? Completely Completely   How often have you been bothered by emotional problems such as feeling anxious, depressed or irritable? Often Often   How would you rate your fatigue on average? Moderate Moderate   How would you rate your pain on average?   0 = No Pain  to  10 = Worst Imaginable Pain 0 0   In general, would you say your health is: 4 4   In general, would you say your quality of life is: 5 4   In general, how would you rate your physical health? 4 4   In general, how would you rate your mental health, including your mood and your ability to think? 2 3   In general, how would you rate your satisfaction with your social activities and relationships? 3 3   In general, please rate how well you carry out your usual social activities and roles. (This includes activities at home, at work and in your community, and responsibilities as a parent, child, spouse, employee, friend, etc.) 4 2   To what extent are you able to carry out your everyday physical activities such as walking, climbing stairs, carrying groceries, or moving a chair? 5 5   In the past 7 days, how often have you been bothered by emotional problems such as feeling anxious, depressed, or irritable? 4 4   In the past 7 days, how would you rate your fatigue on average? 3 3   In the past 7 days, how would you rate your pain on average, where 0 means no pain, and 10 means worst imaginable pain? 0 0   Global Mental Health Score 12 12   Global  Physical Health Score 17 17   PROMIS TOTAL - SUBSCORES 29 29   Some recent data might be hidden     PROMIS 10-Global Health (only subscores and total score):   PROMIS-10 Scores Only 12/30/2021 1/11/2022   Global Mental Health Score 12 12   Global Physical Health Score 17 17   PROMIS TOTAL - SUBSCORES 29 29     [place] Suicide Severity Rating Scale (Lifetime/Recent)  [place] Suicide Severity Rating (Lifetime/Recent) 1/5/2022   Wish to be Dead (Lifetime) No   Non-Specific Active Suicidal Thoughts (Lifetime) No   Has subject engaged in non-suicidal self-injurious behavior? (Lifetime) No         ASSESSMENT: Current Emotional / Mental Status (status of significant symptoms):   Risk status (Self / Other harm or suicidal ideation)   Patient denies current fears or concerns for personal safety.   Patient denies current or recent suicidal ideation or behaviors.   Patient denies current or recent homicidal ideation or behaviors.   Patient denies current or recent self injurious behavior or ideation.   Patient denies other safety concerns.   Patient reports there has been no change in risk factors since their last session.     Patient reports there has been no change in protective factors since their last session.     Recommended that patient call 911 or go to the local ED should there be a change in any of these risk factors.     Appearance:   Appropriate    Eye Contact:   Good    Psychomotor Behavior: Normal    Attitude:   Cooperative    Orientation:   All   Speech    Rate / Production: Normal     Volume:  Normal    Mood:    Sad    Affect:    Tearful   Thought Content:  Clear    Thought Form:  Coherent  Logical    Insight:    Good      Medication Review:   No current psychiatric medications prescribed     Medication Compliance:   NA     Changes in Health Issues:   None reported     Chemical Use Review:   Substance Use: Chemical use reviewed, no active concerns identified      Tobacco Use: No current tobacco use.   "    Diagnosis:  1. Adjustment disorder with mixed anxiety and depressed mood        Collateral Reports Completed:   Not Applicable    PLAN: (Patient Tasks / Therapist Tasks / Other)  Patient will use thought stopping strategy \"what will happen vs what will happen\". Patient will use reframing to combat automatic thoughts.         YECENIA Santamaria, SW       This note has been reviewed and I agree with the plan of care. This note is co-signed by Carlota Bush, [facility] Supervisor, on: March 10, 2022                                                     ______________________________________________________________________    Individual Treatment Plan    Patient's Name: Mima Barrett  YOB: 1945    Date of Creation: 1/13/2022  Date Treatment Plan Last Reviewed/Revised: 1/13/2022 due 4/13/2022    DSM5 Diagnoses: Adjustment Disorders  309.28 (F43.23) With mixed anxiety and depressed mood  Psychosocial / Contextual Factors: family friction   PROMIS (reviewed every 90 days): 1/11/2022 due 4/11/2022    Referral / Collaboration:  Referral to another professional/service is not indicated at this time..    Anticipated number of session for this episode of care: 10  Anticipation frequency of session: Every 3 months  Anticipated Duration of each session: 38-52 minutes  Treatment plan will be reviewed in 90 days or when goals have been changed.     MeasurableTreatment Goal(s) related to diagnosis / functional impairment(s)  Goal 1: Patient will gain skills and education to manage depression in a healthy manner, as measured by PHQ-9.    I will know I've met my goal when I feel rested in the morning, not fatigued, and receive 7-8 hours of sleep,       Objective #A (Patient Action)    Patient will identify 3-5 stressors which contribute to feelings of depression.  Status: New - Date: 1/13/2022     Intervention(s)  Therapist will assign homework identifying feelings of depression.    Objective #B  Patient will " identify 3-5 strategies to more effectively address stressors.  Status: New - Date: 1/13/2022     Intervention(s)  Therapist will teach healthy coping skills.    Objective #C  Patient will participate in 3-5 activities to improve mood.  Status: New - Date: 1/13/2022     Intervention(s)  Therapist will assign homework involving activities to improve mood.      Goal 2: Patient will manage anxiety in healthy ways measured by DAVID-7.     I will know I've met my goal when my anxious thoughts no longer consumes my day      Objective #A (Patient Action)    Status: New - Date: 1/13/2022     Patient will Identify negative self-talk and behaviors: challenge core beliefs, myths, and actions.    Intervention(s)  Therapist will teach positive self talk.    Objective #B  Patient will identify 3-5 stressors which contribute to feelings of anxiety.    Status: New - Date: 1/13/2022     Intervention(s)  Therapist will assign homework identifying anxious thoughts/ fears .    Objective #C  Patient will use at least 3-5 coping skills for anxiety management in the next 5 weeks.  Status: New - Date: 1/13/2022     Intervention(s)  Therapist will teach emotional regulation skills. Anxiety coping mechanisms .        Chante KEENE COLLEEN   January 13, 2022  This note has been reviewed and I agree with the treatment plan of care. This note is co-signed by Carlota Bush [facility] Supervisor, on: January 27, 2022

## 2022-03-05 ASSESSMENT — ANXIETY QUESTIONNAIRES: GAD7 TOTAL SCORE: 9

## 2022-03-23 ENCOUNTER — VIRTUAL VISIT (OUTPATIENT)
Dept: PSYCHOLOGY | Facility: CLINIC | Age: 77
End: 2022-03-23
Payer: MEDICARE

## 2022-03-23 DIAGNOSIS — F43.23 ADJUSTMENT DISORDER WITH MIXED ANXIETY AND DEPRESSED MOOD: Primary | ICD-10-CM

## 2022-03-23 PROCEDURE — 90834 PSYTX W PT 45 MINUTES: CPT | Mod: 95

## 2022-03-23 NOTE — PROGRESS NOTES
"[facility]                                     Progress Note    Patient Name: Mima Barrett  Date: 3/23/2022         Service Type: Individual      Session Start Time: 900  Session End Time: 950     Session Length: 38-52    Session #: 7    Attendees: Client attended alone    Service Modality:  Video Visit:      Provider verified identity through the following two step process.  Patient provided:  Patient  and Patient is known previously to provider    Telemedicine Visit: The patient's condition can be safely assessed and treated via synchronous audio and visual telemedicine encounter.      Reason for Telemedicine Visit: Patient has requested telehealth visit    Originating Site (Patient Location): Patient's home    Distant Site (Provider Location): Provider Remote Setting- Home Office    Consent:  The patient/guardian has verbally consented to: the potential risks and benefits of telemedicine (video visit) versus in person care; bill my insurance or make self-payment for services provided; and responsibility for payment of non-covered services.     Patient would like the video invitation sent by:  My Chart    Mode of Communication:  Video Conference via Amwell    As the provider I attest to compliance with applicable laws and regulations related to telemedicine.    DATA  Interactive Complexity: No  Crisis: No        Progress Since Last Session (Related to Symptoms / Goals / Homework):   Symptoms: Worsening per DAVID-7    Homework: Achieved / completed to satisfaction      Episode of Care Goals: Minimal progress - ACTION (Actively working towards change); Intervened by reinforcing change plan / affirming steps taken     Current / Ongoing Stressors and Concerns:   The reason for seeking services at this time is: \"Difficult family situation dealing with probable daughter's divorce\" \"stress and anxiety) The problem(s) began 21.     Patient has not attempted to resolve these concerns in " the past.     Treatment Objective(s) Addressed in This Session:   use at least 2 coping skills for anxiety management in the next 3 weeks       Intervention:   CBT: Met with patient to review goals and interventions. Provided safe, non judgmental  space to process family distress. Guided patient through distress tolerance skills to use  If needed at an upcoming event. Validated patient's emotions/ feelings. Some technical difficulties having to restart session a few times. Recommended strategies to support with grandchildren through this difficult time.       Assessments completed prior to visit:  The following assessments were completed by patient for this visit:  PHQ2:   PHQ-2 ( 1999 [employer]) 1/13/2022 1/5/2022 10/4/2021 8/10/2020 1/29/2016   Q1: Little interest or pleasure in doing things 1 1 0 1 0   Q2: Feeling down, depressed or hopeless 1 1 0 1 0   PHQ-2 Score 2 2 0 2 0   PHQ-2 Total Score (12-17 Years)- Positive if 3 or more points; Administer PHQ-A if positive - - 0 2 -   Q1: Little interest or pleasure in doing things Several days Several days - - -   Q2: Feeling down, depressed or hopeless Several days Several days - - -   PHQ-2 Score 2 2 - - -     PHQ9:   PHQ-9 SCORE 8/2/2018 8/8/2019 8/10/2020 10/4/2021 1/5/2022 2/4/2022 3/4/2022   PHQ-9 Total Score 1 0 2 2 7 4 4     GAD2:   DAVID-2 12/30/2021 1/11/2022   Feeling nervous, anxious, or on edge 3 2   Not being able to stop or control worrying 3 2   DAVID-2 Total Score 6 4     GAD7:   DAVID-7 SCORE 8/2/2018 8/8/2019 8/10/2020 10/4/2021 12/30/2021 2/4/2022 3/4/2022   Total Score - - - - 12 (moderate anxiety) - -   Total Score 0 0 2 0 12 8 9     CAGE-AID:   CAGE-AID Total Score 12/30/2021   Total Score 0   Total Score MyChart 0 (A total score of 2 or greater is considered clinically significant)     PROMIS 10-Global Health (all questions and answers displayed):   PROMIS 10 12/30/2021 1/11/2022   In general, would you say your health is: Very good Very good   In  general, would you say your quality of life is: Excellent Very good   In general, how would you rate your physical health? Very good Very good   In general, how would you rate your mental health, including your mood and your ability to think? Fair Good   In general, how would you rate your satisfaction with your social activities and relationships? Good Good   In general, please rate how well you carry out your usual social activities and roles Very good Fair   To what extent are you able to carry out your everyday physical activities such as walking, climbing stairs, carrying groceries, or moving a chair? Completely Completely   How often have you been bothered by emotional problems such as feeling anxious, depressed or irritable? Often Often   How would you rate your fatigue on average? Moderate Moderate   How would you rate your pain on average?   0 = No Pain  to  10 = Worst Imaginable Pain 0 0   In general, would you say your health is: 4 4   In general, would you say your quality of life is: 5 4   In general, how would you rate your physical health? 4 4   In general, how would you rate your mental health, including your mood and your ability to think? 2 3   In general, how would you rate your satisfaction with your social activities and relationships? 3 3   In general, please rate how well you carry out your usual social activities and roles. (This includes activities at home, at work and in your community, and responsibilities as a parent, child, spouse, employee, friend, etc.) 4 2   To what extent are you able to carry out your everyday physical activities such as walking, climbing stairs, carrying groceries, or moving a chair? 5 5   In the past 7 days, how often have you been bothered by emotional problems such as feeling anxious, depressed, or irritable? 4 4   In the past 7 days, how would you rate your fatigue on average? 3 3   In the past 7 days, how would you rate your pain on average, where 0 means no  pain, and 10 means worst imaginable pain? 0 0   Global Mental Health Score 12 12   Global Physical Health Score 17 17   PROMIS TOTAL - SUBSCORES 29 29   Some recent data might be hidden     PROMIS 10-Global Health (only subscores and total score):   PROMIS-10 Scores Only 12/30/2021 1/11/2022   Global Mental Health Score 12 12   Global Physical Health Score 17 17   PROMIS TOTAL - SUBSCORES 29 29     [place] Suicide Severity Rating Scale (Lifetime/Recent)  [place] Suicide Severity Rating (Lifetime/Recent) 1/5/2022   Wish to be Dead (Lifetime) No   Non-Specific Active Suicidal Thoughts (Lifetime) No   Has subject engaged in non-suicidal self-injurious behavior? (Lifetime) No         ASSESSMENT: Current Emotional / Mental Status (status of significant symptoms):   Risk status (Self / Other harm or suicidal ideation)   Patient denies current fears or concerns for personal safety.   Patient denies current or recent suicidal ideation or behaviors.   Patient denies current or recent homicidal ideation or behaviors.   Patient denies current or recent self injurious behavior or ideation.   Patient denies other safety concerns.   Patient reports there has been no change in risk factors since their last session.     Patient reports there has been no change in protective factors since their last session.     Recommended that patient call 911 or go to the local ED should there be a change in any of these risk factors.     Appearance:   Appropriate    Eye Contact:   Good    Psychomotor Behavior: Normal    Attitude:   Cooperative    Orientation:   All   Speech    Rate / Production: Normal     Volume:  Normal    Mood:    Normal   Affect:    Appropriate    Thought Content:  Clear    Thought Form:  Coherent  Logical    Insight:    Good      Medication Review:   No current psychiatric medications prescribed     Medication Compliance:   NA     Changes in Health Issues:   None reported     Chemical Use Review:   Substance Use: Chemical  use reviewed, no active concerns identified      Tobacco Use: No current tobacco use.      Diagnosis:  1. Adjustment disorder with mixed anxiety and depressed mood        Collateral Reports Completed:   Not Applicable    PLAN: (Patient Tasks / Therapist Tasks / Other)  Patient will use thought stopping strategies to cope. Patient will make use of time outs when feeling overwhelmed.       YECENIA Santamaria, [facility]    This note has been reviewed and I agree with the plan of care. This note is co-signed by Carlota Bush [facility] Supervisor, on: March 24, 2022                                                     ______________________________________________________________________    Individual Treatment Plan    Patient's Name: Mima Barrett  YOB: 1945    Date of Creation: 1/13/2022  Date Treatment Plan Last Reviewed/Revised: 1/13/2022 due 4/13/2022    DSM5 Diagnoses: Adjustment Disorders  309.28 (F43.23) With mixed anxiety and depressed mood  Psychosocial / Contextual Factors: family friction   PROMIS (reviewed every 90 days): 1/11/2022 due 4/11/2022    Referral / Collaboration:  Referral to another professional/service is not indicated at this time..    Anticipated number of session for this episode of care: 10  Anticipation frequency of session: Every 3 months  Anticipated Duration of each session: 38-52 minutes  Treatment plan will be reviewed in 90 days or when goals have been changed.     MeasurableTreatment Goal(s) related to diagnosis / functional impairment(s)  Goal 1: Patient will gain skills and education to manage depression in a healthy manner, as measured by PHQ-9.    I will know I've met my goal when I feel rested in the morning, not fatigued, and receive 7-8 hours of sleep,       Objective #A (Patient Action)    Patient will identify 3-5 stressors which contribute to feelings of depression.  Status: New - Date: 1/13/2022     Intervention(s)  Therapist will assign homework  identifying feelings of depression.    Objective #B  Patient will identify 3-5 strategies to more effectively address stressors.  Status: New - Date: 1/13/2022     Intervention(s)  Therapist will teach healthy coping skills.    Objective #C  Patient will participate in 3-5 activities to improve mood.  Status: New - Date: 1/13/2022     Intervention(s)  Therapist will assign homework involving activities to improve mood.      Goal 2: Patient will manage anxiety in healthy ways measured by DAVID-7.     I will know I've met my goal when my anxious thoughts no longer consumes my day      Objective #A (Patient Action)    Status: New - Date: 1/13/2022     Patient will Identify negative self-talk and behaviors: challenge core beliefs, myths, and actions.    Intervention(s)  Therapist will teach positive self talk.    Objective #B  Patient will identify 3-5 stressors which contribute to feelings of anxiety.    Status: New - Date: 1/13/2022     Intervention(s)  Therapist will assign homework identifying anxious thoughts/ fears .    Objective #C  Patient will use at least 3-5 coping skills for anxiety management in the next 5 weeks.  Status: New - Date: 1/13/2022     Intervention(s)  Therapist will teach emotional regulation skills. Anxiety coping mechanisms .        Chante KEENE, COLLEEN   January 13, 2022  This note has been reviewed and I agree with the treatment plan of care. This note is co-signed by Carlota Bush [facility], on: January 27, 2022

## 2022-04-13 ENCOUNTER — VIRTUAL VISIT (OUTPATIENT)
Dept: PSYCHOLOGY | Facility: CLINIC | Age: 77
End: 2022-04-13
Payer: MEDICARE

## 2022-04-13 DIAGNOSIS — F43.23 ADJUSTMENT DISORDER WITH MIXED ANXIETY AND DEPRESSED MOOD: Primary | ICD-10-CM

## 2022-04-13 PROCEDURE — 90834 PSYTX W PT 45 MINUTES: CPT | Mod: 95

## 2022-04-13 NOTE — PROGRESS NOTES
"[facility]                                     Progress Note    Patient Name: Mima Barrett  Date: 2022         Service Type: Individual      Session Start Time: 1000  Session End Time: 1050     Session Length: 38-52    Session #: 8    Attendees: Client attended alone    Service Modality:  Video Visit:      Provider verified identity through the following two step process.  Patient provided:  Patient  and Patient is known previously to provider    Telemedicine Visit: The patient's condition can be safely assessed and treated via synchronous audio and visual telemedicine encounter.      Reason for Telemedicine Visit: Patient has requested telehealth visit    Originating Site (Patient Location): Patient's home    Distant Site (Provider Location): Provider Remote Setting- Home Office    Consent:  The patient/guardian has verbally consented to: the potential risks and benefits of telemedicine (video visit) versus in person care; bill my insurance or make self-payment for services provided; and responsibility for payment of non-covered services.     Patient would like the video invitation sent by:  My Chart    Mode of Communication:  Video Conference via Amwell    As the provider I attest to compliance with applicable laws and regulations related to telemedicine.    DATA  Interactive Complexity: No  Crisis: No        Progress Since Last Session (Related to Symptoms / Goals / Homework):   Symptoms: Worsening per DAVID-7    Homework: Achieved / completed to satisfaction      Episode of Care Goals: Minimal progress - ACTION (Actively working towards change); Intervened by reinforcing change plan / affirming steps taken     Current / Ongoing Stressors and Concerns:   The reason for seeking services at this time is: \"Difficult family situation dealing with probable daughter's divorce\" \"stress and anxiety) The problem(s) began 21.     Patient has not attempted to resolve these concerns in " the past.     Treatment Objective(s) Addressed in This Session:   use at least 2 coping skills for anxiety management in the next 3 weeks       Intervention:   CBT: Met with patient to review goals and interventions. Updated treatment plan. Patient request to continue with therapy and working on goals, therapist agreed. Therapist offered supportive listening as patient processed disappointment, grief, angry, worry and sadness. Provided psychoeducation on anxiety. Reviewed life restriction anxiety creates.  Patient will use effective communication to discuss concerns with family.       Assessments completed prior to visit:  The following assessments were completed by patient for this visit:  PHQ2:   PHQ-2 ( 1999 [employer]) 1/13/2022 1/5/2022 10/4/2021 8/10/2020 1/29/2016   Q1: Little interest or pleasure in doing things 1 1 0 1 0   Q2: Feeling down, depressed or hopeless 1 1 0 1 0   PHQ-2 Score 2 2 0 2 0   PHQ-2 Total Score (12-17 Years)- Positive if 3 or more points; Administer PHQ-A if positive - - 0 2 -   Q1: Little interest or pleasure in doing things Several days Several days - - -   Q2: Feeling down, depressed or hopeless Several days Several days - - -   PHQ-2 Score 2 2 - - -     PHQ9:   PHQ-9 SCORE 8/2/2018 8/8/2019 8/10/2020 10/4/2021 1/5/2022 2/4/2022 3/4/2022   PHQ-9 Total Score 1 0 2 2 7 4 4     GAD2:   DAVID-2 12/30/2021 1/11/2022 4/12/2022   Feeling nervous, anxious, or on edge 3 2 1   Not being able to stop or control worrying 3 2 1   DAVID-2 Total Score 6 4 2     GAD7:   DAVID-7 SCORE 8/2/2018 8/8/2019 8/10/2020 10/4/2021 12/30/2021 2/4/2022 3/4/2022   Total Score - - - - 12 (moderate anxiety) - -   Total Score 0 0 2 0 12 8 9     CAGE-AID:   CAGE-AID Total Score 12/30/2021   Total Score 0   Total Score MyChart 0 (A total score of 2 or greater is considered clinically significant)     PROMIS 10-Global Health (all questions and answers displayed):   PROMIS 10 12/30/2021 1/11/2022 4/12/2022   In general, would  you say your health is: Very good Very good Good   In general, would you say your quality of life is: Excellent Very good Very good   In general, how would you rate your physical health? Very good Very good Very good   In general, how would you rate your mental health, including your mood and your ability to think? Fair Good Good   In general, how would you rate your satisfaction with your social activities and relationships? Good Good Good   In general, please rate how well you carry out your usual social activities and roles Very good Fair Good   To what extent are you able to carry out your everyday physical activities such as walking, climbing stairs, carrying groceries, or moving a chair? Completely Completely Completely   How often have you been bothered by emotional problems such as feeling anxious, depressed or irritable? Often Often Sometimes   How would you rate your fatigue on average? Moderate Moderate Moderate   How would you rate your pain on average?   0 = No Pain  to  10 = Worst Imaginable Pain 0 0 0   In general, would you say your health is: 4 4 3   In general, would you say your quality of life is: 5 4 4   In general, how would you rate your physical health? 4 4 4   In general, how would you rate your mental health, including your mood and your ability to think? 2 3 3   In general, how would you rate your satisfaction with your social activities and relationships? 3 3 3   In general, please rate how well you carry out your usual social activities and roles. (This includes activities at home, at work and in your community, and responsibilities as a parent, child, spouse, employee, friend, etc.) 4 2 3   To what extent are you able to carry out your everyday physical activities such as walking, climbing stairs, carrying groceries, or moving a chair? 5 5 5   In the past 7 days, how often have you been bothered by emotional problems such as feeling anxious, depressed, or irritable? 4 4 3   In the past  7 days, how would you rate your fatigue on average? 3 3 3   In the past 7 days, how would you rate your pain on average, where 0 means no pain, and 10 means worst imaginable pain? 0 0 0   Global Mental Health Score 12 12 13   Global Physical Health Score 17 17 17   PROMIS TOTAL - SUBSCORES 29 29 30   Some recent data might be hidden     PROMIS 10-Global Health (only subscores and total score):   PROMIS-10 Scores Only 12/30/2021 1/11/2022 4/12/2022   Global Mental Health Score 12 12 13   Global Physical Health Score 17 17 17   PROMIS TOTAL - SUBSCORES 29 29 30     [place] Suicide Severity Rating Scale (Lifetime/Recent)  [place] Suicide Severity Rating (Lifetime/Recent) 1/5/2022   Wish to be Dead (Lifetime) No   Non-Specific Active Suicidal Thoughts (Lifetime) No   Has subject engaged in non-suicidal self-injurious behavior? (Lifetime) No         ASSESSMENT: Current Emotional / Mental Status (status of significant symptoms):   Risk status (Self / Other harm or suicidal ideation)   Patient denies current fears or concerns for personal safety.   Patient denies current or recent suicidal ideation or behaviors.   Patient denies current or recent homicidal ideation or behaviors.   Patient denies current or recent self injurious behavior or ideation.   Patient denies other safety concerns.   Patient reports there has been no change in risk factors since their last session.     Patient reports there has been no change in protective factors since their last session.     Recommended that patient call 911 or go to the local ED should there be a change in any of these risk factors.     Appearance:   Appropriate    Eye Contact:   Good    Psychomotor Behavior: Normal    Attitude:   Cooperative  Interested Pleasant   Orientation:   All   Speech    Rate / Production: Normal     Volume:  Normal    Mood:    Normal   Affect:    Appropriate    Thought Content:  Clear    Thought Form:  Coherent  Logical    Insight:    Good       Medication Review:   No current psychiatric medications prescribed     Medication Compliance:   NA     Changes in Health Issues:   None reported     Chemical Use Review:   Substance Use: Chemical use reviewed, no active concerns identified      Tobacco Use: No current tobacco use.      Diagnosis:  1. Adjustment disorder with mixed anxiety and depressed mood        Collateral Reports Completed:   Not Applicable    PLAN: (Patient Tasks / Therapist Tasks / Other)  Patient will communicate concerns with daughter using effective communication skills.        YECENIA Santamaria, SW     This note has been reviewed and I agree with the plan of care. This note is co-signed by Carlota Bush, [facility] Supervisor, on: April 14, 2022                                                     ______________________________________________________________________    Individual Treatment Plan    Patient's Name: Mima Barrett  YOB: 1945    Date of Creation: 1/13/2022  Date Treatment Plan Last Reviewed/Revised: 4/13/2022 due 7/13/2022    DSM5 Diagnoses: Adjustment Disorders  309.28 (F43.23) With mixed anxiety and depressed mood  Psychosocial / Contextual Factors: family friction   PROMIS (reviewed every 90 days): 1/11/2022 due 4/11/2022    Referral / Collaboration:  Referral to another professional/service is not indicated at this time..    Anticipated number of session for this episode of care: 10  Anticipation frequency of session: Every 3 months  Anticipated Duration of each session: 38-52 minutes  Treatment plan will be reviewed in 90 days or when goals have been changed.     MeasurableTreatment Goal(s) related to diagnosis / functional impairment(s)  Goal 1: Patient will gain skills and education to manage depression in a healthy manner, as measured by PHQ-9.    I will know I've met my goal when I feel rested in the morning, not fatigued, and receive 7-8 hours of sleep,       Objective #A (Patient  Action)    Patient will identify 3-5 stressors which contribute to feelings of depression.  Status: Completed - Date: 4/13/2022     Intervention(s)  Therapist will assign homework identifying feelings of depression.    Objective #B  Patient will identify 3-5 strategies to more effectively address stressors.  Status: Completed - Date: 4/13/2022     Intervention(s)  Therapist will teach healthy coping skills.    Objective #C  Patient will participate in 3-5 activities to improve mood.  Status: Continued - Date(s):4/13/2022     Intervention(s)  Therapist will assign homework involving activities to improve mood.      Goal 2: Patient will manage anxiety in healthy ways measured by DAVID-7.     I will know I've met my goal when my anxious thoughts no longer consumes my day      Objective #A (Patient Action)    Status: Continued - Date(s): 4/13/2022     Patient will Identify negative self-talk and behaviors: challenge core beliefs, myths, and actions.    Intervention(s)  Therapist will teach positive self talk.    Objective #B  Patient will identify 3-5 stressors which contribute to feelings of anxiety.    Status: Completed - Date: 4/13/2022      Intervention(s)  Therapist will assign homework identifying anxious thoughts/ fears .    Objective #C  Patient will use at least 3-5 coping skills for anxiety management in the next 5 weeks.  Status: Completed - Date: 4/13/2022     Intervention(s)  Therapist will teach emotional regulation skills. Anxiety coping mechanisms .        Chante KEENE, SW   January 13, 2022  This note has been reviewed and I agree with the treatment plan of care. This note is co-signed by Carlota Bush [facility] Supervisor, on: April 14, 2022

## 2022-05-03 ASSESSMENT — PATIENT HEALTH QUESTIONNAIRE - PHQ9
SUM OF ALL RESPONSES TO PHQ QUESTIONS 1-9: 7
10. IF YOU CHECKED OFF ANY PROBLEMS, HOW DIFFICULT HAVE THESE PROBLEMS MADE IT FOR YOU TO DO YOUR WORK, TAKE CARE OF THINGS AT HOME, OR GET ALONG WITH OTHER PEOPLE: SOMEWHAT DIFFICULT
SUM OF ALL RESPONSES TO PHQ QUESTIONS 1-9: 7

## 2022-05-04 ENCOUNTER — VIRTUAL VISIT (OUTPATIENT)
Dept: PSYCHOLOGY | Facility: CLINIC | Age: 77
End: 2022-05-04
Payer: MEDICARE

## 2022-05-04 DIAGNOSIS — F43.23 ADJUSTMENT DISORDER WITH MIXED ANXIETY AND DEPRESSED MOOD: Primary | ICD-10-CM

## 2022-05-04 PROCEDURE — 90834 PSYTX W PT 45 MINUTES: CPT | Mod: 95

## 2022-05-04 ASSESSMENT — PATIENT HEALTH QUESTIONNAIRE - PHQ9: SUM OF ALL RESPONSES TO PHQ QUESTIONS 1-9: 7

## 2022-05-04 NOTE — PROGRESS NOTES
"[facility] Counseling                                     Progress Note    Patient Name: Mima Barrett  Date: 2022         Service Type: Individual      Session Start Time: 1000  Session End Time: 1050     Session Length: 38-52    Session #: 9    Attendees: Client attended alone    Service Modality:  Video Visit:      Provider verified identity through the following two step process.  Patient provided:  Patient  and Patient is known previously to provider    Telemedicine Visit: The patient's condition can be safely assessed and treated via synchronous audio and visual telemedicine encounter.      Reason for Telemedicine Visit: Patient has requested telehealth visit    Originating Site (Patient Location): Patient's home    Distant Site (Provider Location): Provider Remote Setting- Home Office    Consent:  The patient/guardian has verbally consented to: the potential risks and benefits of telemedicine (video visit) versus in person care; bill my insurance or make self-payment for services provided; and responsibility for payment of non-covered services.     Patient would like the video invitation sent by:  My Chart    Mode of Communication:  Video Conference via Amwell    As the provider I attest to compliance with applicable laws and regulations related to telemedicine.    DATA  Interactive Complexity: No  Crisis: No        Progress Since Last Session (Related to Symptoms / Goals / Homework):   Symptoms: Worsening per patient report    Homework: Achieved / completed to satisfaction      Episode of Care Goals: Minimal progress - ACTION (Actively working towards change); Intervened by reinforcing change plan / affirming steps taken     Current / Ongoing Stressors and Concerns:   The reason for seeking services at this time is: \"Difficult family situation dealing with probable daughter's divorce\" \"stress and anxiety) The problem(s) began 21.     Patient has not attempted to resolve these " concerns in the past.     Treatment Objective(s) Addressed in This Session:   identify 1-2 strategies to more effectively address stressors       Intervention:   CBT:  Met with patient to review goals and interventions. Provided active listening as patient processed family stressors. Provided psychoeducation on depression and anxiety. Gave patient permission to discuss her worries and feelings with a trusted and supportive friend. Explained how friends can relieve stress and help cope.        Assessments completed prior to visit:  The following assessments were completed by patient for this visit:  PHQ2:   PHQ-2 ( 1999 [employer]) 5/3/2022 1/13/2022 1/5/2022 10/4/2021 8/10/2020 1/29/2016   Q1: Little interest or pleasure in doing things 1 1 1 0 1 0   Q2: Feeling down, depressed or hopeless 2 1 1 0 1 0   PHQ-2 Score 3 2 2 0 2 0   PHQ-2 Total Score (12-17 Years)- Positive if 3 or more points; Administer PHQ-A if positive - - - 0 2 -   Q1: Little interest or pleasure in doing things Several days Several days Several days - - -   Q2: Feeling down, depressed or hopeless More than half the days Several days Several days - - -   PHQ-2 Score 3 2 2 - - -     PHQ9:   PHQ-9 SCORE 8/8/2019 8/10/2020 10/4/2021 1/5/2022 2/4/2022 3/4/2022 5/3/2022   PHQ-9 Total Score MyChart - - - - - - 7 (Mild depression)   PHQ-9 Total Score 0 2 2 7 4 4 7     GAD2:   DAVID-2 12/30/2021 1/11/2022 4/12/2022   Feeling nervous, anxious, or on edge 3 2 1   Not being able to stop or control worrying 3 2 1   DAVID-2 Total Score 6 4 2     GAD7:   DAVID-7 SCORE 8/2/2018 8/8/2019 8/10/2020 10/4/2021 12/30/2021 2/4/2022 3/4/2022   Total Score - - - - 12 (moderate anxiety) - -   Total Score 0 0 2 0 12 8 9     CAGE-AID:   CAGE-AID Total Score 12/30/2021   Total Score 0   Total Score MyChart 0 (A total score of 2 or greater is considered clinically significant)     PROMIS 10-Global Health (all questions and answers displayed):   PROMIS 10 12/30/2021 1/11/2022 4/12/2022    In general, would you say your health is: Very good Very good Good   In general, would you say your quality of life is: Excellent Very good Very good   In general, how would you rate your physical health? Very good Very good Very good   In general, how would you rate your mental health, including your mood and your ability to think? Fair Good Good   In general, how would you rate your satisfaction with your social activities and relationships? Good Good Good   In general, please rate how well you carry out your usual social activities and roles Very good Fair Good   To what extent are you able to carry out your everyday physical activities such as walking, climbing stairs, carrying groceries, or moving a chair? Completely Completely Completely   How often have you been bothered by emotional problems such as feeling anxious, depressed or irritable? Often Often Sometimes   How would you rate your fatigue on average? Moderate Moderate Moderate   How would you rate your pain on average?   0 = No Pain  to  10 = Worst Imaginable Pain 0 0 0   In general, would you say your health is: 4 4 3   In general, would you say your quality of life is: 5 4 4   In general, how would you rate your physical health? 4 4 4   In general, how would you rate your mental health, including your mood and your ability to think? 2 3 3   In general, how would you rate your satisfaction with your social activities and relationships? 3 3 3   In general, please rate how well you carry out your usual social activities and roles. (This includes activities at home, at work and in your community, and responsibilities as a parent, child, spouse, employee, friend, etc.) 4 2 3   To what extent are you able to carry out your everyday physical activities such as walking, climbing stairs, carrying groceries, or moving a chair? 5 5 5   In the past 7 days, how often have you been bothered by emotional problems such as feeling anxious, depressed, or irritable?  4 4 3   In the past 7 days, how would you rate your fatigue on average? 3 3 3   In the past 7 days, how would you rate your pain on average, where 0 means no pain, and 10 means worst imaginable pain? 0 0 0   Global Mental Health Score 12 12 13   Global Physical Health Score 17 17 17   PROMIS TOTAL - SUBSCORES 29 29 30   Some recent data might be hidden     PROMIS 10-Global Health (only subscores and total score):   PROMIS-10 Scores Only 12/30/2021 1/11/2022 4/12/2022   Global Mental Health Score 12 12 13   Global Physical Health Score 17 17 17   PROMIS TOTAL - SUBSCORES 29 29 30     [place] Suicide Severity Rating Scale (Lifetime/Recent)  [place] Suicide Severity Rating (Lifetime/Recent) 1/5/2022   Wish to be Dead (Lifetime) No   Non-Specific Active Suicidal Thoughts (Lifetime) No   Has subject engaged in non-suicidal self-injurious behavior? (Lifetime) No         ASSESSMENT: Current Emotional / Mental Status (status of significant symptoms):   Risk status (Self / Other harm or suicidal ideation)   Patient denies current fears or concerns for personal safety.   Patient denies current or recent suicidal ideation or behaviors.   Patient denies current or recent homicidal ideation or behaviors.   Patient denies current or recent self injurious behavior or ideation.   Patient denies other safety concerns.   Patient reports there has been no change in risk factors since their last session.     Patient reports there has been no change in protective factors since their last session.     Recommended that patient call 911 or go to the local ED should there be a change in any of these risk factors.     Appearance:   Appropriate    Eye Contact:   Good    Psychomotor Behavior: Normal    Attitude:   Cooperative    Orientation:   All   Speech    Rate / Production: Emotional    Volume:  Normal    Mood:    Sad    Affect:    Appropriate    Thought Content:  Clear    Thought Form:  Circumstantial   Insight:    Good      Medication  Review:   No current psychiatric medications prescribed     Medication Compliance:   NA     Changes in Health Issues:   None reported     Chemical Use Review:   Substance Use: Chemical use reviewed, no active concerns identified      Tobacco Use: No current tobacco use.      Diagnosis:  1. Adjustment disorder with mixed anxiety and depressed mood        Collateral Reports Completed:   Not Applicable    PLAN: (Patient Tasks / Therapist Tasks / Other)  Patient will practice opening up to supportive friends to relieve stress and help with coping.     YECENIA Santamaria, SW     This note has been reviewed and I agree with the plan of care. This note is co-signed by Carlota Bush, [facility] Supervisor, on: May 5, 2022                                                     ______________________________________________________________________    Individual Treatment Plan    Patient's Name: Mima Barrett  YOB: 1945    Date of Creation: 1/13/2022  Date Treatment Plan Last Reviewed/Revised: 4/13/2022 due 7/13/2022    DSM5 Diagnoses: Adjustment Disorders  309.28 (F43.23) With mixed anxiety and depressed mood  Psychosocial / Contextual Factors: family friction   PROMIS (reviewed every 90 days): 4/12/2022 score 30     Referral / Collaboration:  Referral to another professional/service is not indicated at this time..    Anticipated number of session for this episode of care: 10  Anticipation frequency of session: Every 3 months  Anticipated Duration of each session: 38-52 minutes  Treatment plan will be reviewed in 90 days or when goals have been changed.     MeasurableTreatment Goal(s) related to diagnosis / functional impairment(s)  Goal 1: Patient will gain skills and education to manage depression in a healthy manner, as measured by PHQ-9.    I will know I've met my goal when I feel rested in the morning, not fatigued, and receive 7-8 hours of sleep,       Objective #A (Patient Action)    Patient will  identify 3-5 stressors which contribute to feelings of depression.  Status: Completed - Date: 4/13/2022     Intervention(s)  Therapist will assign homework identifying feelings of depression.    Objective #B  Patient will identify 3-5 strategies to more effectively address stressors.  Status: Completed - Date: 4/13/2022     Intervention(s)  Therapist will teach healthy coping skills.    Objective #C  Patient will participate in 3-5 activities to improve mood.  Status: Continued - Date(s):4/13/2022     Intervention(s)  Therapist will assign homework involving activities to improve mood.      Goal 2: Patient will manage anxiety in healthy ways measured by DAVID-7.     I will know I've met my goal when my anxious thoughts no longer consumes my day      Objective #A (Patient Action)    Status: Continued - Date(s): 4/13/2022     Patient will Identify negative self-talk and behaviors: challenge core beliefs, myths, and actions.    Intervention(s)  Therapist will teach positive self talk.    Objective #B  Patient will identify 3-5 stressors which contribute to feelings of anxiety.    Status: Completed - Date: 4/13/2022      Intervention(s)  Therapist will assign homework identifying anxious thoughts/ fears .    Objective #C  Patient will use at least 3-5 coping skills for anxiety management in the next 5 weeks.  Status: Completed - Date: 4/13/2022     Intervention(s)  Therapist will teach emotional regulation skills. Anxiety coping mechanisms .        Chante KEENE, COLLEEN   January 13, 2022  This note has been reviewed and I agree with the treatment plan of care. This note is co-signed by Carlota Bush [facility], on: April 14, 2022

## 2022-05-18 ENCOUNTER — VIRTUAL VISIT (OUTPATIENT)
Dept: PSYCHOLOGY | Facility: CLINIC | Age: 77
End: 2022-05-18
Payer: MEDICARE

## 2022-05-18 DIAGNOSIS — F43.23 ADJUSTMENT DISORDER WITH MIXED ANXIETY AND DEPRESSED MOOD: Primary | ICD-10-CM

## 2022-05-18 PROCEDURE — 90834 PSYTX W PT 45 MINUTES: CPT | Mod: 95

## 2022-05-18 NOTE — PROGRESS NOTES
"[facility] Counseling                                     Progress Note    Patient Name: Mima Barrett  Date: 2022         Service Type: Individual      Session Start Time: 900  Session End Time: 950     Session Length: 50 mins    Session #: 10    Attendees: Client attended alone    Service Modality:  Video Visit:      Provider verified identity through the following two step process.  Patient provided:  Patient  and Patient is known previously to provider    Telemedicine Visit: The patient's condition can be safely assessed and treated via synchronous audio and visual telemedicine encounter.      Reason for Telemedicine Visit: Patient has requested telehealth visit    Originating Site (Patient Location): Patient's home    Distant Site (Provider Location): Provider Remote Setting- Home Office    Consent:  The patient/guardian has verbally consented to: the potential risks and benefits of telemedicine (video visit) versus in person care; bill my insurance or make self-payment for services provided; and responsibility for payment of non-covered services.     Patient would like the video invitation sent by:  My Chart    Mode of Communication:  Video Conference via Amwell    As the provider I attest to compliance with applicable laws and regulations related to telemedicine.    DATA  Interactive Complexity: No  Crisis: No        Progress Since Last Session (Related to Symptoms / Goals / Homework):   Symptoms: No change per patient report    Homework: Achieved / completed to satisfaction      Episode of Care Goals: Satisfactory progress - ACTION (Actively working towards change); Intervened by reinforcing change plan / affirming steps taken     Current / Ongoing Stressors and Concerns:   The reason for seeking services at this time is: \"Difficult family situation dealing with probable daughter's divorce\" \"stress and anxiety) The problem(s) began 21.     Patient has not attempted to resolve " these concerns in the past.     Treatment Objective(s) Addressed in This Session:   identify 1-2 strategies to more effectively address stressors       Intervention:   ACT -  Therapist met with patient to review goals and interventions. Therapist utilized reflected listening as patient gave brief reflection of week. Patient processed current stressors within her family dynamic. Discussed strong emotions and therapist validated her feelings. Reviewed what was in her control vs.what was not in her control. Discussed pain and the attachment to the family dynamic that she is comfortable with vs. letting go.     Assessments completed prior to visit:  The following assessments were completed by patient for this visit:  PHQ2:   PHQ-2 ( 1999 [employer]) 5/3/2022 1/13/2022 1/5/2022 10/4/2021 8/10/2020 1/29/2016   Q1: Little interest or pleasure in doing things 1 1 1 0 1 0   Q2: Feeling down, depressed or hopeless 2 1 1 0 1 0   PHQ-2 Score 3 2 2 0 2 0   PHQ-2 Total Score (12-17 Years)- Positive if 3 or more points; Administer PHQ-A if positive - - - 0 2 -   Q1: Little interest or pleasure in doing things Several days Several days Several days - - -   Q2: Feeling down, depressed or hopeless More than half the days Several days Several days - - -   PHQ-2 Score 3 2 2 - - -     PHQ9:   PHQ-9 SCORE 8/8/2019 8/10/2020 10/4/2021 1/5/2022 2/4/2022 3/4/2022 5/3/2022   PHQ-9 Total Score MyChart - - - - - - 7 (Mild depression)   PHQ-9 Total Score 0 2 2 7 4 4 7     GAD2:   DAVID-2 12/30/2021 1/11/2022 4/12/2022   Feeling nervous, anxious, or on edge 3 2 1   Not being able to stop or control worrying 3 2 1   DAVID-2 Total Score 6 4 2     GAD7:   DAVID-7 SCORE 8/2/2018 8/8/2019 8/10/2020 10/4/2021 12/30/2021 2/4/2022 3/4/2022   Total Score - - - - 12 (moderate anxiety) - -   Total Score 0 0 2 0 12 8 9     CAGE-AID:   CAGE-AID Total Score 12/30/2021   Total Score 0   Total Score MyChart 0 (A total score of 2 or greater is considered clinically  significant)     PROMIS 10-Global Health (all questions and answers displayed):   PROMIS 10 12/30/2021 1/11/2022 4/12/2022   In general, would you say your health is: Very good Very good Good   In general, would you say your quality of life is: Excellent Very good Very good   In general, how would you rate your physical health? Very good Very good Very good   In general, how would you rate your mental health, including your mood and your ability to think? Fair Good Good   In general, how would you rate your satisfaction with your social activities and relationships? Good Good Good   In general, please rate how well you carry out your usual social activities and roles Very good Fair Good   To what extent are you able to carry out your everyday physical activities such as walking, climbing stairs, carrying groceries, or moving a chair? Completely Completely Completely   How often have you been bothered by emotional problems such as feeling anxious, depressed or irritable? Often Often Sometimes   How would you rate your fatigue on average? Moderate Moderate Moderate   How would you rate your pain on average?   0 = No Pain  to  10 = Worst Imaginable Pain 0 0 0   In general, would you say your health is: 4 4 3   In general, would you say your quality of life is: 5 4 4   In general, how would you rate your physical health? 4 4 4   In general, how would you rate your mental health, including your mood and your ability to think? 2 3 3   In general, how would you rate your satisfaction with your social activities and relationships? 3 3 3   In general, please rate how well you carry out your usual social activities and roles. (This includes activities at home, at work and in your community, and responsibilities as a parent, child, spouse, employee, friend, etc.) 4 2 3   To what extent are you able to carry out your everyday physical activities such as walking, climbing stairs, carrying groceries, or moving a chair? 5 5 5   In  the past 7 days, how often have you been bothered by emotional problems such as feeling anxious, depressed, or irritable? 4 4 3   In the past 7 days, how would you rate your fatigue on average? 3 3 3   In the past 7 days, how would you rate your pain on average, where 0 means no pain, and 10 means worst imaginable pain? 0 0 0   Global Mental Health Score 12 12 13   Global Physical Health Score 17 17 17   PROMIS TOTAL - SUBSCORES 29 29 30   Some recent data might be hidden     PROMIS 10-Global Health (only subscores and total score):   PROMIS-10 Scores Only 12/30/2021 1/11/2022 4/12/2022   Global Mental Health Score 12 12 13   Global Physical Health Score 17 17 17   PROMIS TOTAL - SUBSCORES 29 29 30     [place] Suicide Severity Rating Scale (Lifetime/Recent)  [place] Suicide Severity Rating (Lifetime/Recent) 1/5/2022   Wish to be Dead (Lifetime) No   Non-Specific Active Suicidal Thoughts (Lifetime) No   Has subject engaged in non-suicidal self-injurious behavior? (Lifetime) No         ASSESSMENT: Current Emotional / Mental Status (status of significant symptoms):   Risk status (Self / Other harm or suicidal ideation)   Patient denies current fears or concerns for personal safety.   Patient denies current or recent suicidal ideation or behaviors.   Patient denies current or recent homicidal ideation or behaviors.   Patient denies current or recent self injurious behavior or ideation.   Patient denies other safety concerns.   Patient reports there has been no change in risk factors since their last session.     Patient reports there has been no change in protective factors since their last session.     Recommended that patient call 911 or go to the local ED should there be a change in any of these risk factors.     Appearance:   Appropriate    Eye Contact:   Good    Psychomotor Behavior: Normal    Attitude:   Cooperative  Interested   Orientation:   All   Speech    Rate / Production: Normal/  Responsive    Volume:  Normal    Mood:    Normal   Affect:    Appropriate    Thought Content:  Clear    Thought Form:  Circumstantial   Insight:    Good      Medication Review:   No current psychiatric medications prescribed     Medication Compliance:   NA     Changes in Health Issues:   None reported     Chemical Use Review:   Substance Use: Chemical use reviewed, no active concerns identified      Tobacco Use: No current tobacco use.      Diagnosis:  1. Adjustment disorder with mixed anxiety and depressed mood        Collateral Reports Completed:   Not Applicable    PLAN: (Patient Tasks / Therapist Tasks / Other)  Patient will work on letting go of unhelpful thoughts.     YECENIA Santamaria, LGSW       This note has been reviewed and I agree with the plan of care. This note is co-signed by Carlota Bush [facility] Supervisor, on: May 26, 2022                                                    ______________________________________________________________________    Individual Treatment Plan    Patient's Name: Mima Barrett  YOB: 1945    Date of Creation: 1/13/2022  Date Treatment Plan Last Reviewed/Revised: 4/13/2022 due 7/13/2022    DSM5 Diagnoses: Adjustment Disorders  309.28 (F43.23) With mixed anxiety and depressed mood  Psychosocial / Contextual Factors: family friction   PROMIS (reviewed every 90 days): 4/12/2022 score 30     Referral / Collaboration:  Referral to another professional/service is not indicated at this time..    Anticipated number of session for this episode of care: 10  Anticipation frequency of session: Every 3 months  Anticipated Duration of each session: 38-52 minutes  Treatment plan will be reviewed in 90 days or when goals have been changed.     MeasurableTreatment Goal(s) related to diagnosis / functional impairment(s)  Goal 1: Patient will gain skills and education to manage depression in a healthy manner, as measured by PHQ-9.    I will know I've met my goal  when I feel rested in the morning, not fatigued, and receive 7-8 hours of sleep,       Objective #A (Patient Action)    Patient will identify 3-5 stressors which contribute to feelings of depression.  Status: Completed - Date: 4/13/2022     Intervention(s)  Therapist will assign homework identifying feelings of depression.    Objective #B  Patient will identify 3-5 strategies to more effectively address stressors.  Status: Completed - Date: 4/13/2022     Intervention(s)  Therapist will teach healthy coping skills.    Objective #C  Patient will participate in 3-5 activities to improve mood.  Status: Continued - Date(s):4/13/2022     Intervention(s)  Therapist will assign homework involving activities to improve mood.      Goal 2: Patient will manage anxiety in healthy ways measured by DAVID-7.     I will know I've met my goal when my anxious thoughts no longer consumes my day      Objective #A (Patient Action)    Status: Continued - Date(s): 4/13/2022     Patient will Identify negative self-talk and behaviors: challenge core beliefs, myths, and actions.    Intervention(s)  Therapist will teach positive self talk.    Objective #B  Patient will identify 3-5 stressors which contribute to feelings of anxiety.    Status: Completed - Date: 4/13/2022      Intervention(s)  Therapist will assign homework identifying anxious thoughts/ fears .    Objective #C  Patient will use at least 3-5 coping skills for anxiety management in the next 5 weeks.  Status: Completed - Date: 4/13/2022     Intervention(s)  Therapist will teach emotional regulation skills. Anxiety coping mechanisms .        Chante KEENE, [facility]   January 13, 2022  This note has been reviewed and I agree with the treatment plan of care. This note is co-signed by Carlota Bush, [facility] Supervisor, on: April 14, 2022

## 2022-06-17 ENCOUNTER — VIRTUAL VISIT (OUTPATIENT)
Dept: PSYCHOLOGY | Facility: CLINIC | Age: 77
End: 2022-06-17
Payer: MEDICARE

## 2022-06-17 DIAGNOSIS — F43.23 ADJUSTMENT DISORDER WITH MIXED ANXIETY AND DEPRESSED MOOD: Primary | ICD-10-CM

## 2022-06-17 PROCEDURE — 90834 PSYTX W PT 45 MINUTES: CPT | Mod: 95

## 2022-06-20 NOTE — PROGRESS NOTES
"[facility] Counseling                                     Progress Note    Patient Name: Mima Barrett  Date: 2022         Service Type: Individual      Session Start Time: 1:30 PM  Session End Time: 2:15PM     Session Length: 45 mins    Session #: 11    Attendees: Client attended alone    Service Modality:  Video Visit:      Provider verified identity through the following two step process.  Patient provided:  Patient  and Patient is known previously to provider    Telemedicine Visit: The patient's condition can be safely assessed and treated via synchronous audio and visual telemedicine encounter.      Reason for Telemedicine Visit: Patient has requested telehealth visit    Originating Site (Patient Location): Patient's home    Distant Site (Provider Location): Provider Remote Setting- Home Office    Consent:  The patient/guardian has verbally consented to: the potential risks and benefits of telemedicine (video visit) versus in person care; bill my insurance or make self-payment for services provided; and responsibility for payment of non-covered services.     Patient would like the video invitation sent by:  My Chart    Mode of Communication:  Video Conference via Amwell    As the provider I attest to compliance with applicable laws and regulations related to telemedicine.    DATA  Interactive Complexity: No  Crisis: No        Progress Since Last Session (Related to Symptoms / Goals / Homework):   Symptoms: No change per patient report    Homework: Achieved / completed to satisfaction      Episode of Care Goals: Satisfactory progress - ACTION (Actively working towards change); Intervened by reinforcing change plan / affirming steps taken     Current / Ongoing Stressors and Concerns:   The reason for seeking services at this time is: \"Difficult family situation dealing with probable daughter's divorce\" \"stress and anxiety) The problem(s) began 21.     Patient has not attempted to " resolve these concerns in the past.     Treatment Objective(s) Addressed in This Session:   identify 1-2 strategies to more effectively address stressors       Intervention:   ACT -  Therapist met with patient to review goals and interventions. Therapist utilized reflected listening as patient gave brief reflection of week. Patient processed new stressors and anxiety regarding daughter's divorce. Therapist continued from last session increasing psychological flexibility with letting go of unhelpful thoughts, attachments and rigidity towards her idea of family unit. Discussed what she can do that's in her control to help her grandchildren feel heard.        Assessments completed prior to visit:  The following assessments were completed by patient for this visit:  PHQ2:   PHQ-2 ( 1999 [employer]) 5/3/2022 1/13/2022 1/5/2022 10/4/2021 8/10/2020 1/29/2016   Q1: Little interest or pleasure in doing things 1 1 1 0 1 0   Q2: Feeling down, depressed or hopeless 2 1 1 0 1 0   PHQ-2 Score 3 2 2 0 2 0   PHQ-2 Total Score (12-17 Years)- Positive if 3 or more points; Administer PHQ-A if positive - - - 0 2 -   Q1: Little interest or pleasure in doing things Several days Several days Several days - - -   Q2: Feeling down, depressed or hopeless More than half the days Several days Several days - - -   PHQ-2 Score 3 2 2 - - -     PHQ9:   PHQ-9 SCORE 8/8/2019 8/10/2020 10/4/2021 1/5/2022 2/4/2022 3/4/2022 5/3/2022   PHQ-9 Total Score MyChart - - - - - - 7 (Mild depression)   PHQ-9 Total Score 0 2 2 7 4 4 7     GAD2:   DAVID-2 12/30/2021 1/11/2022 4/12/2022   Feeling nervous, anxious, or on edge 3 2 1   Not being able to stop or control worrying 3 2 1   DAVID-2 Total Score 6 4 2     GAD7:   DAVID-7 SCORE 8/2/2018 8/8/2019 8/10/2020 10/4/2021 12/30/2021 2/4/2022 3/4/2022   Total Score - - - - 12 (moderate anxiety) - -   Total Score 0 0 2 0 12 8 9     CAGE-AID:   CAGE-AID Total Score 12/30/2021   Total Score 0   Total Score MyChart 0 (A total  score of 2 or greater is considered clinically significant)     PROMIS 10-Global Health (all questions and answers displayed):   PROMIS 10 12/30/2021 1/11/2022 4/12/2022   In general, would you say your health is: Very good Very good Good   In general, would you say your quality of life is: Excellent Very good Very good   In general, how would you rate your physical health? Very good Very good Very good   In general, how would you rate your mental health, including your mood and your ability to think? Fair Good Good   In general, how would you rate your satisfaction with your social activities and relationships? Good Good Good   In general, please rate how well you carry out your usual social activities and roles Very good Fair Good   To what extent are you able to carry out your everyday physical activities such as walking, climbing stairs, carrying groceries, or moving a chair? Completely Completely Completely   How often have you been bothered by emotional problems such as feeling anxious, depressed or irritable? Often Often Sometimes   How would you rate your fatigue on average? Moderate Moderate Moderate   How would you rate your pain on average?   0 = No Pain  to  10 = Worst Imaginable Pain 0 0 0   In general, would you say your health is: 4 4 3   In general, would you say your quality of life is: 5 4 4   In general, how would you rate your physical health? 4 4 4   In general, how would you rate your mental health, including your mood and your ability to think? 2 3 3   In general, how would you rate your satisfaction with your social activities and relationships? 3 3 3   In general, please rate how well you carry out your usual social activities and roles. (This includes activities at home, at work and in your community, and responsibilities as a parent, child, spouse, employee, friend, etc.) 4 2 3   To what extent are you able to carry out your everyday physical activities such as walking, climbing stairs,  carrying groceries, or moving a chair? 5 5 5   In the past 7 days, how often have you been bothered by emotional problems such as feeling anxious, depressed, or irritable? 4 4 3   In the past 7 days, how would you rate your fatigue on average? 3 3 3   In the past 7 days, how would you rate your pain on average, where 0 means no pain, and 10 means worst imaginable pain? 0 0 0   Global Mental Health Score 12 12 13   Global Physical Health Score 17 17 17   PROMIS TOTAL - SUBSCORES 29 29 30   Some recent data might be hidden     PROMIS 10-Global Health (only subscores and total score):   PROMIS-10 Scores Only 12/30/2021 1/11/2022 4/12/2022   Global Mental Health Score 12 12 13   Global Physical Health Score 17 17 17   PROMIS TOTAL - SUBSCORES 29 29 30     [place] Suicide Severity Rating Scale (Lifetime/Recent)  [place] Suicide Severity Rating (Lifetime/Recent) 1/5/2022   Wish to be Dead (Lifetime) No   Non-Specific Active Suicidal Thoughts (Lifetime) No   Has subject engaged in non-suicidal self-injurious behavior? (Lifetime) No         ASSESSMENT: Current Emotional / Mental Status (status of significant symptoms):   Risk status (Self / Other harm or suicidal ideation)   Patient denies current fears or concerns for personal safety.   Patient denies current or recent suicidal ideation or behaviors.   Patient denies current or recent homicidal ideation or behaviors.   Patient denies current or recent self injurious behavior or ideation.   Patient denies other safety concerns.   Patient reports there has been no change in risk factors since their last session.     Patient reports there has been no change in protective factors since their last session.     Recommended that patient call 911 or go to the local ED should there be a change in any of these risk factors.     Appearance:   Appropriate    Eye Contact:   Good    Psychomotor Behavior: Normal    Attitude:   Cooperative  Interested  Pleasant   Orientation:   All   Speech    Rate / Production: Normal/ Responsive    Volume:  Normal    Mood:    Normal   Affect:    Appropriate    Thought Content:  Clear    Thought Form:  Circumstantial   Insight:    Good      Medication Review:   No current psychiatric medications prescribed     Medication Compliance:   NA     Changes in Health Issues:   None reported     Chemical Use Review:   Substance Use: Chemical use reviewed, no active concerns identified      Tobacco Use: No current tobacco use.      Diagnosis:  1. Adjustment disorder with mixed anxiety and depressed mood        Collateral Reports Completed:   Not Applicable    PLAN: (Patient Tasks / Therapist Tasks / Other)  Patient will focus energy on things that's in her control.     YECENIA Santamaria, SW     This note has been reviewed and I agree with the plan of care. This note is co-signed by Carlota Bush [facility] Supervisor, on: June 23, 2022                                                   ______________________________________________________________________    Individual Treatment Plan    Patient's Name: Mima Barrett  YOB: 1945    Date of Creation: 1/13/2022  Date Treatment Plan Last Reviewed/Revised: 4/13/2022 due 7/13/2022    DSM5 Diagnoses: Adjustment Disorders  309.28 (F43.23) With mixed anxiety and depressed mood  Psychosocial / Contextual Factors: family friction   PROMIS (reviewed every 90 days): 4/12/2022 score 30     Referral / Collaboration:  Referral to another professional/service is not indicated at this time..    Anticipated number of session for this episode of care: 10  Anticipation frequency of session: Every 3 months  Anticipated Duration of each session: 38-52 minutes  Treatment plan will be reviewed in 90 days or when goals have been changed.     MeasurableTreatment Goal(s) related to diagnosis / functional impairment(s)  Goal 1: Patient will gain skills and education to manage depression in a  healthy manner, as measured by PHQ-9.    I will know I've met my goal when I feel rested in the morning, not fatigued, and receive 7-8 hours of sleep,       Objective #A (Patient Action)    Patient will identify 3-5 stressors which contribute to feelings of depression.  Status: Completed - Date: 4/13/2022     Intervention(s)  Therapist will assign homework identifying feelings of depression.    Objective #B  Patient will identify 3-5 strategies to more effectively address stressors.  Status: Completed - Date: 4/13/2022     Intervention(s)  Therapist will teach healthy coping skills.    Objective #C  Patient will participate in 3-5 activities to improve mood.  Status: Continued - Date(s):4/13/2022     Intervention(s)  Therapist will assign homework involving activities to improve mood.      Goal 2: Patient will manage anxiety in healthy ways measured by DAVID-7.     I will know I've met my goal when my anxious thoughts no longer consumes my day      Objective #A (Patient Action)    Status: Continued - Date(s): 4/13/2022     Patient will Identify negative self-talk and behaviors: challenge core beliefs, myths, and actions.    Intervention(s)  Therapist will teach positive self talk.    Objective #B  Patient will identify 3-5 stressors which contribute to feelings of anxiety.    Status: Completed - Date: 4/13/2022      Intervention(s)  Therapist will assign homework identifying anxious thoughts/ fears .    Objective #C  Patient will use at least 3-5 coping skills for anxiety management in the next 5 weeks.  Status: Completed - Date: 4/13/2022     Intervention(s)  Therapist will teach emotional regulation skills. Anxiety coping mechanisms .        Chante KEENE, COLLEEN   January 13, 2022  This note has been reviewed and I agree with the treatment plan of care. This note is co-signed by Carlota Bush [facility] Supervisor, on: April 14, 2022

## 2022-07-14 ENCOUNTER — VIRTUAL VISIT (OUTPATIENT)
Dept: PSYCHOLOGY | Facility: CLINIC | Age: 77
End: 2022-07-14
Payer: MEDICARE

## 2022-07-14 DIAGNOSIS — F43.23 ADJUSTMENT DISORDER WITH MIXED ANXIETY AND DEPRESSED MOOD: Primary | ICD-10-CM

## 2022-07-14 PROCEDURE — 90834 PSYTX W PT 45 MINUTES: CPT | Mod: 95

## 2022-07-14 NOTE — PROGRESS NOTES
"[facility] Counseling                                      Progress Note    Patient Name: Mima Barrett  Date: 2022         Service Type: Individual      Session Start Time: 8:00 AM  Session End Time: 8:50AM     Session Length: 50 mins    Session #: 12    Attendees: Client attended alone    Service Modality:  Video Visit:      Provider verified identity through the following two step process.  Patient provided:  Patient  and Patient is known previously to provider    Telemedicine Visit: The patient's condition can be safely assessed and treated via synchronous audio and visual telemedicine encounter.      Reason for Telemedicine Visit: Patient has requested telehealth visit    Originating Site (Patient Location): Patient's home    Distant Site (Provider Location): Provider Remote Setting- Home Office    Consent:  The patient/guardian has verbally consented to: the potential risks and benefits of telemedicine (video visit) versus in person care; bill my insurance or make self-payment for services provided; and responsibility for payment of non-covered services.     Patient would like the video invitation sent by:  My Chart    Mode of Communication:  Video Conference via Amwell    As the provider I attest to compliance with applicable laws and regulations related to telemedicine.    DATA  Interactive Complexity: No  Crisis: No        Progress Since Last Session (Related to Symptoms / Goals / Homework):   Symptoms: stable     Homework: Achieved / completed to satisfaction      Episode of Care Goals: Satisfactory progress - ACTION (Actively working towards change); Intervened by reinforcing change plan / affirming steps taken     Current / Ongoing Stressors and Concerns:   The reason for seeking services at this time is: \"Difficult family situation dealing with probable daughter's divorce\" \"stress and anxiety) The problem(s) began 21.     Patient has not attempted to resolve these concerns " in the past.     Treatment Objective(s) Addressed in This Session:   identify 1-2 strategies to more effectively address stressors       Intervention:   ACT -  Therapist met with patient to review goals and interventions. Therapist utilized reflected listening as patient gave brief reflection of week. Patient processed daughter's divorce being final. Patient discussed strong emotions of hurt and disappointment. Therapist worked with patient on allowing unwanted feelings to be there while pursing goals/ values. Patient will invite ex son inlaw parents out while they are in town.    Assessments completed prior to visit:  The following assessments were completed by patient for this visit:  PHQ2:   PHQ-2 ( 1999 [employer]) 5/3/2022 1/13/2022 1/5/2022 10/4/2021 8/10/2020 1/29/2016   Q1: Little interest or pleasure in doing things 1 1 1 0 1 0   Q2: Feeling down, depressed or hopeless 2 1 1 0 1 0   PHQ-2 Score 3 2 2 0 2 0   PHQ-2 Total Score (12-17 Years)- Positive if 3 or more points; Administer PHQ-A if positive - - - 0 2 -   Q1: Little interest or pleasure in doing things Several days Several days Several days - - -   Q2: Feeling down, depressed or hopeless More than half the days Several days Several days - - -   PHQ-2 Score 3 2 2 - - -     PHQ9:   PHQ-9 SCORE 8/8/2019 8/10/2020 10/4/2021 1/5/2022 2/4/2022 3/4/2022 5/3/2022   PHQ-9 Total Score MyChart - - - - - - 7 (Mild depression)   PHQ-9 Total Score 0 2 2 7 4 4 7     GAD2:   DAVID-2 12/30/2021 1/11/2022 4/12/2022 7/14/2022   Feeling nervous, anxious, or on edge 3 2 1 1   Not being able to stop or control worrying 3 2 1 1   DAVID-2 Total Score 6 4 2 2     GAD7:   DAVID-7 SCORE 8/2/2018 8/8/2019 8/10/2020 10/4/2021 12/30/2021 2/4/2022 3/4/2022   Total Score - - - - 12 (moderate anxiety) - -   Total Score 0 0 2 0 12 8 9     CAGE-AID:   CAGE-AID Total Score 12/30/2021   Total Score 0   Total Score MyChart 0 (A total score of 2 or greater is considered clinically significant)      PROMIS 10-Global Health (all questions and answers displayed):   PROMIS 10 12/30/2021 1/11/2022 4/12/2022 7/14/2022   In general, would you say your health is: Very good Very good Good Good   In general, would you say your quality of life is: Excellent Very good Very good Very good   In general, how would you rate your physical health? Very good Very good Very good Fair   In general, how would you rate your mental health, including your mood and your ability to think? Fair Good Good Good   In general, how would you rate your satisfaction with your social activities and relationships? Good Good Good Good   In general, please rate how well you carry out your usual social activities and roles Very good Fair Good Very good   To what extent are you able to carry out your everyday physical activities such as walking, climbing stairs, carrying groceries, or moving a chair? Completely Completely Completely Completely   How often have you been bothered by emotional problems such as feeling anxious, depressed or irritable? Often Often Sometimes Sometimes   How would you rate your fatigue on average? Moderate Moderate Moderate Moderate   How would you rate your pain on average?   0 = No Pain  to  10 = Worst Imaginable Pain 0 0 0 3   In general, would you say your health is: 4 4 3 3   In general, would you say your quality of life is: 5 4 4 4   In general, how would you rate your physical health? 4 4 4 2   In general, how would you rate your mental health, including your mood and your ability to think? 2 3 3 3   In general, how would you rate your satisfaction with your social activities and relationships? 3 3 3 3   In general, please rate how well you carry out your usual social activities and roles. (This includes activities at home, at work and in your community, and responsibilities as a parent, child, spouse, employee, friend, etc.) 4 2 3 4   To what extent are you able to carry out your everyday physical activities  such as walking, climbing stairs, carrying groceries, or moving a chair? 5 5 5 5   In the past 7 days, how often have you been bothered by emotional problems such as feeling anxious, depressed, or irritable? 4 4 3 3   In the past 7 days, how would you rate your fatigue on average? 3 3 3 3   In the past 7 days, how would you rate your pain on average, where 0 means no pain, and 10 means worst imaginable pain? 0 0 0 3   Global Mental Health Score 12 12 13 13   Global Physical Health Score 17 17 17 14   PROMIS TOTAL - SUBSCORES 29 29 30 27   Some recent data might be hidden     PROMIS 10-Global Health (only subscores and total score):   PROMIS-10 Scores Only 12/30/2021 1/11/2022 4/12/2022 7/14/2022   Global Mental Health Score 12 12 13 13   Global Physical Health Score 17 17 17 14   PROMIS TOTAL - SUBSCORES 29 29 30 27     [place] Suicide Severity Rating Scale (Lifetime/Recent)  [place] Suicide Severity Rating (Lifetime/Recent) 1/5/2022   Wish to be Dead (Lifetime) No   Non-Specific Active Suicidal Thoughts (Lifetime) No   Has subject engaged in non-suicidal self-injurious behavior? (Lifetime) No         ASSESSMENT: Current Emotional / Mental Status (status of significant symptoms):   Risk status (Self / Other harm or suicidal ideation)   Patient denies current fears or concerns for personal safety.   Patient denies current or recent suicidal ideation or behaviors.   Patient denies current or recent homicidal ideation or behaviors.   Patient denies current or recent self injurious behavior or ideation.   Patient denies other safety concerns.   Patient reports there has been no change in risk factors since their last session.     Patient reports there has been no change in protective factors since their last session.     Recommended that patient call 911 or go to the local ED should there be a change in any of these risk factors.     Appearance:   Appropriate    Eye Contact:   Good    Psychomotor Behavior: Normal     Attitude:   Cooperative  Interested Friendly Pleasant   Orientation:   All   Speech    Rate / Production: Normal/ Responsive    Volume:  Normal    Mood:    Normal   Affect:    Appropriate    Thought Content:  Clear    Thought Form:  Circumstantial   Insight:    Good      Medication Review:   No current psychiatric medications prescribed     Medication Compliance:   NA     Changes in Health Issues:   Yes: gallbladder pain     Chemical Use Review:   Substance Use: Chemical use reviewed, no active concerns identified      Tobacco Use: No current tobacco use.      Diagnosis:  1. Adjustment disorder with mixed anxiety and depressed mood        Collateral Reports Completed:   Not Applicable    PLAN: (Patient Tasks / Therapist Tasks / Other)  Patient will contact extended family to prearrange social event.     YECENIA Santamaria, LGSW    This note has been reviewed and I agree with the plan of care. This note is co-signed by Carlota Bush [facility] Supervisor, on: July 14, 2022                                                   ______________________________________________________________________    Individual Treatment Plan    Patient's Name: Mima Barrett  YOB: 1945    Date of Creation: 1/13/2022  Date Treatment Plan Last Reviewed/Revised: 7/14/2022    DSM5 Diagnoses: Adjustment Disorders  309.28 (F43.23) With mixed anxiety and depressed mood  Psychosocial / Contextual Factors: family friction   PROMIS (reviewed every 90 days): 7/14/2022 score 30     Referral / Collaboration:  Referral to another professional/service is not indicated at this time..    Anticipated number of session for this episode of care: 10  Anticipation frequency of session: Every 3 months  Anticipated Duration of each session: 38-52 minutes  Treatment plan will be reviewed in 90 days or when goals have been changed.     MeasurableTreatment Goal(s) related to diagnosis / functional impairment(s)  Goal 1: Patient will gain  skills and education to manage depression in a healthy manner, as measured by PHQ-9.    I will know I've met my goal when I feel rested in the morning, not fatigued, and receive 7-8 hours of sleep,       Objective #A (Patient Action)    Patient will identify 3-5 stressors which contribute to feelings of depression.  Status: Completed - Date: 4/13/2022     Intervention(s)  Therapist will assign homework identifying feelings of depression.    Objective #B  Patient will identify 3-5 strategies to more effectively address stressors.  Status: Completed - Date: 4/13/2022     Intervention(s)  Therapist will teach healthy coping skills.    Objective #C  Patient will participate in 3-5 activities to improve mood.  Status: Continued - Date(s):7/14/2022     Intervention(s)  Therapist will assign homework involving activities to improve mood.      Goal 2: Patient will manage anxiety in healthy ways measured by DAVID-7.     I will know I've met my goal when my anxious thoughts no longer consumes my day      Objective #A (Patient Action)    Status: Continued - Date(s): 7/14/2022     Patient will Identify negative self-talk and behaviors: challenge core beliefs, myths, and actions.    Intervention(s)  Therapist will teach positive self talk.    Objective #B  Patient will identify 3-5 stressors which contribute to feelings of anxiety.    Status: Completed - Date: 4/13/2022      Intervention(s)  Therapist will assign homework identifying anxious thoughts/ fears .    Objective #C  Patient will use at least 3-5 coping skills for anxiety management in the next 5 weeks.  Status: Completed - Date: 4/13/2022     Intervention(s)  Therapist will teach emotional regulation skills. Anxiety coping mechanisms .        DARCI Vega   July 14, 2022  This note has been reviewed and I agree with the treatment plan of care. This note is co-signed by [facility], on: July 14, 2022

## 2022-08-03 ENCOUNTER — VIRTUAL VISIT (OUTPATIENT)
Dept: PSYCHOLOGY | Facility: CLINIC | Age: 77
End: 2022-08-03
Payer: MEDICARE

## 2022-08-03 DIAGNOSIS — F43.23 ADJUSTMENT DISORDER WITH MIXED ANXIETY AND DEPRESSED MOOD: Primary | ICD-10-CM

## 2022-08-03 PROCEDURE — 90837 PSYTX W PT 60 MINUTES: CPT | Mod: 95

## 2022-08-03 NOTE — PROGRESS NOTES
[facility]                                      Progress Note    Patient Name: Mima Barrett  Date: 2022         Service Type: Individual      Session Start Time: 8:01 AM  Session End Time: 8:59AM     Session Length: 58 mins    Session #: 14    Attendees: Client attended alone    Service Modality:  Video Visit:      Provider verified identity through the following two step process.  Patient provided:  Patient  and Patient is known previously to provider    Telemedicine Visit: The patient's condition can be safely assessed and treated via synchronous audio and visual telemedicine encounter.      Reason for Telemedicine Visit: Patient has requested telehealth visit    Originating Site (Patient Location): Patient's home    Distant Site (Provider Location): Provider Remote Setting- Home Office    Consent:  The patient/guardian has verbally consented to: the potential risks and benefits of telemedicine (video visit) versus in person care; bill my insurance or make self-payment for services provided; and responsibility for payment of non-covered services.     Patient would like the video invitation sent by:  My Chart    Mode of Communication:  Video Conference via Amwell    As the provider I attest to compliance with applicable laws and regulations related to telemedicine.    DATA  Extended Session (53+ minutes): PROLONGED SERVICE IN THE OUTPATIENT SETTING REQUIRING DIRECT (FACE-TO-FACE) PATIENT CONTACT BEYOND THE USUAL SERVICE:    - Patient's presenting concerns require more intensive intervention than could be completed within the usual service  Interactive Complexity: No  Crisis: No     Progress Since Last Session (Related to Symptoms / Goals / Homework):   Symptoms: Improving per patient report    Homework: Achieved / completed to satisfaction      Episode of Care Goals: Satisfactory progress - ACTION (Actively working towards change); Intervened by reinforcing change plan /  "affirming steps taken     Current / Ongoing Stressors and Concerns:   The reason for seeking services at this time is: \"Difficult family situation dealing with probable daughter's divorce\" \"stress and anxiety) The problem(s) began 06/20/21.     Patient has not attempted to resolve these concerns in the past.     Treatment Objective(s) Addressed in This Session:   identify 1-2 strategies to more effectively address stressors       Intervention:   ACT -  Therapist met with patient to review goals and interventions. Therapist utilized reflected listening as patient gave brief reflection of week. Completed mindfulness exercise Patient reports last couple weeks have been busy and a lot positive changes. Patient continues to feel depressed about family transitions. Introduced nervous system regulation basics as a way to self-soothe. Reviewed deep breathing and physical touch/ grouding. Provided psychoeducation on family therapy.     Assessments completed prior to visit:  The following assessments were completed by patient for this visit:  PHQ2:   PHQ-2 ( 1999 [employer]) 8/3/2022 5/3/2022 1/13/2022 1/5/2022 10/4/2021 8/10/2020 1/29/2016   Q1: Little interest or pleasure in doing things 1 1 1 1 0 1 0   Q2: Feeling down, depressed or hopeless 1 2 1 1 0 1 0   PHQ-2 Score 2 3 2 2 0 2 0   PHQ-2 Total Score (12-17 Years)- Positive if 3 or more points; Administer PHQ-A if positive - - - - 0 2 -   Q1: Little interest or pleasure in doing things Several days Several days Several days Several days - - -   Q2: Feeling down, depressed or hopeless Several days More than half the days Several days Several days - - -   PHQ-2 Score 2 3 2 2 - - -     PHQ9:   PHQ-9 SCORE 8/8/2019 8/10/2020 10/4/2021 1/5/2022 2/4/2022 3/4/2022 5/3/2022   PHQ-9 Total Score MyChart - - - - - - 7 (Mild depression)   PHQ-9 Total Score 0 2 2 7 4 4 7     GAD2:   DAVID-2 12/30/2021 1/11/2022 4/12/2022 7/14/2022   Feeling nervous, anxious, or on edge 3 2 1 1   Not being " able to stop or control worrying 3 2 1 1   DAVID-2 Total Score 6 4 2 2     GAD7:   DAVID-7 SCORE 8/2/2018 8/8/2019 8/10/2020 10/4/2021 12/30/2021 2/4/2022 3/4/2022   Total Score - - - - 12 (moderate anxiety) - -   Total Score 0 0 2 0 12 8 9     CAGE-AID:   CAGE-AID Total Score 12/30/2021   Total Score 0   Total Score MyChart 0 (A total score of 2 or greater is considered clinically significant)     PROMIS 10-Global Health (all questions and answers displayed):   PROMIS 10 12/30/2021 1/11/2022 4/12/2022 7/14/2022   In general, would you say your health is: Very good Very good Good Good   In general, would you say your quality of life is: Excellent Very good Very good Very good   In general, how would you rate your physical health? Very good Very good Very good Fair   In general, how would you rate your mental health, including your mood and your ability to think? Fair Good Good Good   In general, how would you rate your satisfaction with your social activities and relationships? Good Good Good Good   In general, please rate how well you carry out your usual social activities and roles Very good Fair Good Very good   To what extent are you able to carry out your everyday physical activities such as walking, climbing stairs, carrying groceries, or moving a chair? Completely Completely Completely Completely   How often have you been bothered by emotional problems such as feeling anxious, depressed or irritable? Often Often Sometimes Sometimes   How would you rate your fatigue on average? Moderate Moderate Moderate Moderate   How would you rate your pain on average?   0 = No Pain  to  10 = Worst Imaginable Pain 0 0 0 3   In general, would you say your health is: 4 4 3 3   In general, would you say your quality of life is: 5 4 4 4   In general, how would you rate your physical health? 4 4 4 2   In general, how would you rate your mental health, including your mood and your ability to think? 2 3 3 3   In general, how would you  rate your satisfaction with your social activities and relationships? 3 3 3 3   In general, please rate how well you carry out your usual social activities and roles. (This includes activities at home, at work and in your community, and responsibilities as a parent, child, spouse, employee, friend, etc.) 4 2 3 4   To what extent are you able to carry out your everyday physical activities such as walking, climbing stairs, carrying groceries, or moving a chair? 5 5 5 5   In the past 7 days, how often have you been bothered by emotional problems such as feeling anxious, depressed, or irritable? 4 4 3 3   In the past 7 days, how would you rate your fatigue on average? 3 3 3 3   In the past 7 days, how would you rate your pain on average, where 0 means no pain, and 10 means worst imaginable pain? 0 0 0 3   Global Mental Health Score 12 12 13 13   Global Physical Health Score 17 17 17 14   PROMIS TOTAL - SUBSCORES 29 29 30 27   Some recent data might be hidden     PROMIS 10-Global Health (only subscores and total score):   PROMIS-10 Scores Only 12/30/2021 1/11/2022 4/12/2022 7/14/2022   Global Mental Health Score 12 12 13 13   Global Physical Health Score 17 17 17 14   PROMIS TOTAL - SUBSCORES 29 29 30 27     [place] Suicide Severity Rating Scale (Lifetime/Recent)  [place] Suicide Severity Rating (Lifetime/Recent) 1/5/2022   Wish to be Dead (Lifetime) No   Non-Specific Active Suicidal Thoughts (Lifetime) No   Has subject engaged in non-suicidal self-injurious behavior? (Lifetime) No         ASSESSMENT: Current Emotional / Mental Status (status of significant symptoms):   Risk status (Self / Other harm or suicidal ideation)   Patient denies current fears or concerns for personal safety.   Patient denies current or recent suicidal ideation or behaviors.   Patient denies current or recent homicidal ideation or behaviors.   Patient denies current or recent self injurious behavior or ideation.   Patient denies other safety  concerns.   Patient reports there has been no change in risk factors since their last session.     Patient reports there has been no change in protective factors since their last session.     Recommended that patient call 911 or go to the local ED should there be a change in any of these risk factors.     Appearance:   Appropriate    Eye Contact:   Good    Psychomotor Behavior: Normal    Attitude:   Cooperative  Interested   Orientation:   All   Speech    Rate / Production: Emotional    Volume:  Normal    Mood:    Normal   Affect:    Appropriate    Thought Content:  Clear    Thought Form:  Circumstantial   Insight:    Intellectual Insight     Medication Review:   No current psychiatric medications prescribed     Medication Compliance:   NA     Changes in Health Issues:   Yes: gallbladder removed     Chemical Use Review:   Substance Use: Chemical use reviewed, no active concerns identified      Tobacco Use: No current tobacco use.      Diagnosis:  1. Adjustment disorder with mixed anxiety and depressed mood        Collateral Reports Completed:   Not Applicable    PLAN: (Patient Tasks / Therapist Tasks / Other)  Patient will use deep breathing and physical touch/ grounding as self soothing skill.     YECENIA Santamaria, SW     This note has been reviewed and I agree with the plan of care. This note is co-signed by Carlota Bush [facility] Super[facility], on: August 4, 2022                                                  ______________________________________________________________________    Individual Treatment Plan    Patient's Name: Mima Barrett  YOB: 1945    Date of Creation: 1/13/2022  Date Treatment Plan Last Reviewed/Revised: 7/14/2022    DSM5 Diagnoses: Adjustment Disorders  309.28 (F43.23) With mixed anxiety and depressed mood  Psychosocial / Contextual Factors: family friction   PROMIS (reviewed every 90 days): 7/14/2022 score 30     Referral / Collaboration:  Referral to another  professional/service is not indicated at this time..    Anticipated number of session for this episode of care: 10  Anticipation frequency of session: Every 3 months  Anticipated Duration of each session: 38-52 minutes  Treatment plan will be reviewed in 90 days or when goals have been changed.     MeasurableTreatment Goal(s) related to diagnosis / functional impairment(s)  Goal 1: Patient will gain skills and education to manage depression in a healthy manner, as measured by PHQ-9.    I will know I've met my goal when I feel rested in the morning, not fatigued, and receive 7-8 hours of sleep,       Objective #A (Patient Action)    Patient will identify 3-5 stressors which contribute to feelings of depression.  Status: Completed - Date: 4/13/2022     Intervention(s)  Therapist will assign homework identifying feelings of depression.    Objective #B  Patient will identify 3-5 strategies to more effectively address stressors.  Status: Completed - Date: 4/13/2022     Intervention(s)  Therapist will teach healthy coping skills.    Objective #C  Patient will participate in 3-5 activities to improve mood.  Status: Continued - Date(s):7/14/2022     Intervention(s)  Therapist will assign homework involving activities to improve mood.      Goal 2: Patient will manage anxiety in healthy ways measured by DAVID-7.     I will know I've met my goal when my anxious thoughts no longer consumes my day      Objective #A (Patient Action)    Status: Continued - Date(s): 7/14/2022     Patient will Identify negative self-talk and behaviors: challenge core beliefs, myths, and actions.    Intervention(s)  Therapist will teach positive self talk.    Objective #B  Patient will identify 3-5 stressors which contribute to feelings of anxiety.    Status: Completed - Date: 4/13/2022      Intervention(s)  Therapist will assign homework identifying anxious thoughts/ fears .    Objective #C  Patient will use at least 3-5 coping skills for anxiety  management in the next 5 weeks.  Status: Completed - Date: 4/13/2022     Intervention(s)  Therapist will teach emotional regulation skills. Anxiety coping mechanisms .        Chante KEENE, SW   July 14, 2022  This note has been reviewed and I agree with the treatment plan of care. This note is co-signed by Carlota Bush [facility] Supervisor, on: July 14, 2022

## 2022-08-25 ENCOUNTER — VIRTUAL VISIT (OUTPATIENT)
Dept: PSYCHOLOGY | Facility: CLINIC | Age: 77
End: 2022-08-25
Payer: MEDICARE

## 2022-08-25 DIAGNOSIS — F43.23 ADJUSTMENT DISORDER WITH MIXED ANXIETY AND DEPRESSED MOOD: Primary | ICD-10-CM

## 2022-08-25 PROCEDURE — 90834 PSYTX W PT 45 MINUTES: CPT | Mod: 95

## 2022-08-25 NOTE — PROGRESS NOTES
"[facility]                                      Progress Note    Patient Name: Mima Barrett  Date: 2022         Service Type: Individual      Session Start Time: 10:00 AM  Session End Time: 10:50     Session Length: 50 mins    Session #: 15    Attendees: Client attended alone    Service Modality:  Video Visit:      Provider verified identity through the following two step process.  Patient provided:  Patient  and Patient is known previously to provider    Telemedicine Visit: The patient's condition can be safely assessed and treated via synchronous audio and visual telemedicine encounter.      Reason for Telemedicine Visit: Patient has requested telehealth visit    Originating Site (Patient Location): Patient's home    Distant Site (Provider Location): Provider Remote Setting- Home Office    Consent:  The patient/guardian has verbally consented to: the potential risks and benefits of telemedicine (video visit) versus in person care; bill my insurance or make self-payment for services provided; and responsibility for payment of non-covered services.     Patient would like the video invitation sent by:  My Chart    Mode of Communication:  Video Conference via Amwell    As the provider I attest to compliance with applicable laws and regulations related to telemedicine.    DATA  Extended Session (53+ minutes): No  Interactive Complexity: No  Crisis: No     Progress Since Last Session (Related to Symptoms / Goals / Homework):   Symptoms: Improving per patient report    Homework: Achieved / completed to satisfaction      Episode of Care Goals: Satisfactory progress - ACTION (Actively working towards change); Intervened by reinforcing change plan / affirming steps taken     Current / Ongoing Stressors and Concerns:   The reason for seeking services at this time is: \"Difficult family situation dealing with probable daughter's divorce\" \"stress and anxiety) The problem(s) began " 06/20/21.     Patient has not attempted to resolve these concerns in the past.     Treatment Objective(s) Addressed in This Session:   identify 1-2 strategies to more effectively address stressors       Intervention:   ACT -  Therapist met with patient to review goals and interventions. Therapist utilized reflected listening as patient gave brief reflection of week. Patient discussed family distress and recent changes. Patient has been using coping skills that she reports has been helpful in managing and adapting to change. Patient presented concerns with financial boundaries. Provided psychoeducation on personal boundaries and dicussed ways to practice setting proper boundaries.       Assessments completed prior to visit:  The following assessments were completed by patient for this visit:  PHQ2:   PHQ-2 ( 1999 [employer]) 8/3/2022 5/3/2022 1/13/2022 1/5/2022 10/4/2021 8/10/2020 1/29/2016   Q1: Little interest or pleasure in doing things 1 1 1 1 0 1 0   Q2: Feeling down, depressed or hopeless 1 2 1 1 0 1 0   PHQ-2 Score 2 3 2 2 0 2 0   PHQ-2 Total Score (12-17 Years)- Positive if 3 or more points; Administer PHQ-A if positive - - - - 0 2 -   Q1: Little interest or pleasure in doing things Several days Several days Several days Several days - - -   Q2: Feeling down, depressed or hopeless Several days More than half the days Several days Several days - - -   PHQ-2 Score 2 3 2 2 - - -     PHQ9:   PHQ-9 SCORE 8/8/2019 8/10/2020 10/4/2021 1/5/2022 2/4/2022 3/4/2022 5/3/2022   PHQ-9 Total Score MyChart - - - - - - 7 (Mild depression)   PHQ-9 Total Score 0 2 2 7 4 4 7     GAD2:   DAVID-2 12/30/2021 1/11/2022 4/12/2022 7/14/2022   Feeling nervous, anxious, or on edge 3 2 1 1   Not being able to stop or control worrying 3 2 1 1   DAVID-2 Total Score 6 4 2 2     GAD7:   DAVID-7 SCORE 8/2/2018 8/8/2019 8/10/2020 10/4/2021 12/30/2021 2/4/2022 3/4/2022   Total Score - - - - 12 (moderate anxiety) - -   Total Score 0 0 2 0 12 8 9      CAGE-AID:   CAGE-AID Total Score 12/30/2021   Total Score 0   Total Score MyChart 0 (A total score of 2 or greater is considered clinically significant)     PROMIS 10-Global Health (all questions and answers displayed):   PROMIS 10 12/30/2021 1/11/2022 4/12/2022 7/14/2022   In general, would you say your health is: Very good Very good Good Good   In general, would you say your quality of life is: Excellent Very good Very good Very good   In general, how would you rate your physical health? Very good Very good Very good Fair   In general, how would you rate your mental health, including your mood and your ability to think? Fair Good Good Good   In general, how would you rate your satisfaction with your social activities and relationships? Good Good Good Good   In general, please rate how well you carry out your usual social activities and roles Very good Fair Good Very good   To what extent are you able to carry out your everyday physical activities such as walking, climbing stairs, carrying groceries, or moving a chair? Completely Completely Completely Completely   How often have you been bothered by emotional problems such as feeling anxious, depressed or irritable? Often Often Sometimes Sometimes   How would you rate your fatigue on average? Moderate Moderate Moderate Moderate   How would you rate your pain on average?   0 = No Pain  to  10 = Worst Imaginable Pain 0 0 0 3   In general, would you say your health is: 4 4 3 3   In general, would you say your quality of life is: 5 4 4 4   In general, how would you rate your physical health? 4 4 4 2   In general, how would you rate your mental health, including your mood and your ability to think? 2 3 3 3   In general, how would you rate your satisfaction with your social activities and relationships? 3 3 3 3   In general, please rate how well you carry out your usual social activities and roles. (This includes activities at home, at work and in your community, and  responsibilities as a parent, child, spouse, employee, friend, etc.) 4 2 3 4   To what extent are you able to carry out your everyday physical activities such as walking, climbing stairs, carrying groceries, or moving a chair? 5 5 5 5   In the past 7 days, how often have you been bothered by emotional problems such as feeling anxious, depressed, or irritable? 4 4 3 3   In the past 7 days, how would you rate your fatigue on average? 3 3 3 3   In the past 7 days, how would you rate your pain on average, where 0 means no pain, and 10 means worst imaginable pain? 0 0 0 3   Global Mental Health Score 12 12 13 13   Global Physical Health Score 17 17 17 14   PROMIS TOTAL - SUBSCORES 29 29 30 27   Some recent data might be hidden     PROMIS 10-Global Health (only subscores and total score):   PROMIS-10 Scores Only 12/30/2021 1/11/2022 4/12/2022 7/14/2022   Global Mental Health Score 12 12 13 13   Global Physical Health Score 17 17 17 14   PROMIS TOTAL - SUBSCORES 29 29 30 27     [place] Suicide Severity Rating Scale (Lifetime/Recent)  [place] Suicide Severity Rating (Lifetime/Recent) 1/5/2022   Wish to be Dead (Lifetime) No   Non-Specific Active Suicidal Thoughts (Lifetime) No   Has subject engaged in non-suicidal self-injurious behavior? (Lifetime) No         ASSESSMENT: Current Emotional / Mental Status (status of significant symptoms):   Risk status (Self / Other harm or suicidal ideation)   Patient denies current fears or concerns for personal safety.   Patient denies current or recent suicidal ideation or behaviors.   Patient denies current or recent homicidal ideation or behaviors.   Patient denies current or recent self injurious behavior or ideation.   Patient denies other safety concerns.   Patient reports there has been no change in risk factors since their last session.     Patient reports there has been no change in protective factors since their last session.     Recommended that patient call 911 or go to the  local ED should there be a change in any of these risk factors.     Appearance:   Appropriate    Eye Contact:   Good    Psychomotor Behavior: Normal    Attitude:   Cooperative    Orientation:   All   Speech    Rate / Production: Normal     Volume:  Normal    Mood:    Normal   Affect:    Appropriate    Thought Content:  Clear    Thought Form:  Circumstantial   Insight:    Intellectual Insight     Medication Review:   No current psychiatric medications prescribed     Medication Compliance:   NA     Changes in Health Issues:   None reported     Chemical Use Review:   Substance Use: Chemical use reviewed, no active concerns identified      Tobacco Use: No current tobacco use.      Diagnosis:  1. Adjustment disorder with mixed anxiety and depressed mood        Collateral Reports Completed:   Not Applicable    PLAN: (Patient Tasks / Therapist Tasks / Other)  Patient will practice setting boundaries with adult children.     YECENIA Santamaria, SW    This note has been reviewed and I agree with the plan of care. This note is co-signed by Carlota [facility] Supervisor, on: September 1, 2022                                                ______________________________________________________________________    Individual Treatment Plan    Patient's Name: Mima Barrett  YOB: 1945    Date of Creation: 1/13/2022  Date Treatment Plan Last Reviewed/Revised: 7/14/2022    DSM5 Diagnoses: Adjustment Disorders  309.28 (F43.23) With mixed anxiety and depressed mood  Psychosocial / Contextual Factors: family friction   PROMIS (reviewed every 90 days): 7/14/2022 score 30     Referral / Collaboration:  Referral to another professional/service is not indicated at this time..    Anticipated number of session for this episode of care: 10  Anticipation frequency of session: Every 3 months  Anticipated Duration of each session: 38-52 minutes  Treatment plan will be reviewed in 90 days or when goals have been  changed.     MeasurableTreatment Goal(s) related to diagnosis / functional impairment(s)  Goal 1: Patient will gain skills and education to manage depression in a healthy manner, as measured by PHQ-9.    I will know I've met my goal when I feel rested in the morning, not fatigued, and receive 7-8 hours of sleep,       Objective #A (Patient Action)    Patient will identify 3-5 stressors which contribute to feelings of depression.  Status: Completed - Date: 4/13/2022     Intervention(s)  Therapist will assign homework identifying feelings of depression.    Objective #B  Patient will identify 3-5 strategies to more effectively address stressors.  Status: Completed - Date: 4/13/2022     Intervention(s)  Therapist will teach healthy coping skills.    Objective #C  Patient will participate in 3-5 activities to improve mood.  Status: Continued - Date(s):7/14/2022     Intervention(s)  Therapist will assign homework involving activities to improve mood.      Goal 2: Patient will manage anxiety in healthy ways measured by DAVID-7.     I will know I've met my goal when my anxious thoughts no longer consumes my day      Objective #A (Patient Action)    Status: Continued - Date(s): 7/14/2022     Patient will Identify negative self-talk and behaviors: challenge core beliefs, myths, and actions.    Intervention(s)  Therapist will teach positive self talk.    Objective #B  Patient will identify 3-5 stressors which contribute to feelings of anxiety.    Status: Completed - Date: 4/13/2022      Intervention(s)  Therapist will assign homework identifying anxious thoughts/ fears .    Objective #C  Patient will use at least 3-5 coping skills for anxiety management in the next 5 weeks.  Status: Completed - Date: 4/13/2022     Intervention(s)  Therapist will teach emotional regulation skills. Anxiety coping mechanisms .        Chante Goncalves MSW, LGSW   July 14, 2022  This note has been reviewed and I agree with the treatment plan of care.  This note is co-signed by Carlota Bush [facility] Super[facility], on: July 14, 2022

## 2022-09-26 ENCOUNTER — VIRTUAL VISIT (OUTPATIENT)
Dept: PSYCHOLOGY | Facility: CLINIC | Age: 77
End: 2022-09-26
Payer: MEDICARE

## 2022-09-26 DIAGNOSIS — F43.23 ADJUSTMENT DISORDER WITH MIXED ANXIETY AND DEPRESSED MOOD: Primary | ICD-10-CM

## 2022-09-26 PROCEDURE — 90834 PSYTX W PT 45 MINUTES: CPT | Mod: 95

## 2022-09-26 NOTE — PROGRESS NOTES
"[facility]                                      Progress Note    Patient Name: Mima Barrett  Date: 2022         Service Type: Individual      Session Start Time: 09:00 AM  Session End Time: 09:50     Session Length: 50 mins    Session #: 16    Attendees: Client attended alone    Service Modality:  Video Visit:      Provider verified identity through the following two step process.  Patient provided:  Patient  and Patient is known previously to provider    Telemedicine Visit: The patient's condition can be safely assessed and treated via synchronous audio and visual telemedicine encounter.      Reason for Telemedicine Visit: Patient has requested telehealth visit    Originating Site (Patient Location): Patient's home    Distant Site (Provider Location): Provider Remote Setting- Home Office    Consent:  The patient/guardian has verbally consented to: the potential risks and benefits of telemedicine (video visit) versus in person care; bill my insurance or make self-payment for services provided; and responsibility for payment of non-covered services.     Patient would like the video invitation sent by:  My Chart    Mode of Communication:  Video Conference via Amwell    As the provider I attest to compliance with applicable laws and regulations related to telemedicine.    DATA  Extended Session (53+ minutes): No  Interactive Complexity: No  Crisis: No     Progress Since Last Session (Related to Symptoms / Goals / Homework):   Symptoms: No change stable    Homework: Achieved / completed to satisfaction      Episode of Care Goals: Satisfactory progress - ACTION (Actively working towards change); Intervened by reinforcing change plan / affirming steps taken     Current / Ongoing Stressors and Concerns:   The reason for seeking services at this time is: \"Difficult family situation dealing with probable daughter's divorce\" \"stress and anxiety) The problem(s) began 23.     Patient " has not attempted to resolve these concerns in the past.     Treatment Objective(s) Addressed in This Session:   identify 1-2 strategies to more effectively address stressors       Intervention:   ACT -  Therapist met with patient to review goals and interventions. Therapist utilized reflected listening as patient gave brief reflection of week. Patient discussed additional stressors with school starting for her grandchildren. Patient processed frustration with accountability and adult children. Patient reported struggling with anticipatory anxiety. Therapist explained what could happen and what will happen skill to help cope with difficult emotions.   Provided psychoeducation on depression vs adjustment disorder.      Assessments completed prior to visit:  The following assessments were completed by patient for this visit:  PHQ2:   PHQ-2 ( 1999 [employer]) 8/3/2022 5/3/2022 1/13/2022 1/5/2022 10/4/2021 8/10/2020 1/29/2016   Q1: Little interest or pleasure in doing things 1 1 1 1 0 1 0   Q2: Feeling down, depressed or hopeless 1 2 1 1 0 1 0   PHQ-2 Score 2 3 2 2 0 2 0   PHQ-2 Total Score (12-17 Years)- Positive if 3 or more points; Administer PHQ-A if positive - - - - 0 2 -   Q1: Little interest or pleasure in doing things Several days Several days Several days Several days - - -   Q2: Feeling down, depressed or hopeless Several days More than half the days Several days Several days - - -   PHQ-2 Score 2 3 2 2 - - -     PHQ9:   PHQ-9 SCORE 8/8/2019 8/10/2020 10/4/2021 1/5/2022 2/4/2022 3/4/2022 5/3/2022   PHQ-9 Total Score MyChart - - - - - - 7 (Mild depression)   PHQ-9 Total Score 0 2 2 7 4 4 7     GAD2:   DAVID-2 12/30/2021 1/11/2022 4/12/2022 7/14/2022   Feeling nervous, anxious, or on edge 3 2 1 1   Not being able to stop or control worrying 3 2 1 1   DAVID-2 Total Score 6 4 2 2     GAD7:   DAVID-7 SCORE 8/2/2018 8/8/2019 8/10/2020 10/4/2021 12/30/2021 2/4/2022 3/4/2022   Total Score - - - - 12 (moderate anxiety) - -    Total Score 0 0 2 0 12 8 9     CAGE-AID:   CAGE-AID Total Score 12/30/2021   Total Score 0   Total Score MyChart 0 (A total score of 2 or greater is considered clinically significant)     PROMIS 10-Global Health (all questions and answers displayed):   PROMIS 10 12/30/2021 1/11/2022 4/12/2022 7/14/2022   In general, would you say your health is: Very good Very good Good Good   In general, would you say your quality of life is: Excellent Very good Very good Very good   In general, how would you rate your physical health? Very good Very good Very good Fair   In general, how would you rate your mental health, including your mood and your ability to think? Fair Good Good Good   In general, how would you rate your satisfaction with your social activities and relationships? Good Good Good Good   In general, please rate how well you carry out your usual social activities and roles Very good Fair Good Very good   To what extent are you able to carry out your everyday physical activities such as walking, climbing stairs, carrying groceries, or moving a chair? Completely Completely Completely Completely   How often have you been bothered by emotional problems such as feeling anxious, depressed or irritable? Often Often Sometimes Sometimes   How would you rate your fatigue on average? Moderate Moderate Moderate Moderate   How would you rate your pain on average?   0 = No Pain  to  10 = Worst Imaginable Pain 0 0 0 3   In general, would you say your health is: 4 4 3 3   In general, would you say your quality of life is: 5 4 4 4   In general, how would you rate your physical health? 4 4 4 2   In general, how would you rate your mental health, including your mood and your ability to think? 2 3 3 3   In general, how would you rate your satisfaction with your social activities and relationships? 3 3 3 3   In general, please rate how well you carry out your usual social activities and roles. (This includes activities at home, at  work and in your community, and responsibilities as a parent, child, spouse, employee, friend, etc.) 4 2 3 4   To what extent are you able to carry out your everyday physical activities such as walking, climbing stairs, carrying groceries, or moving a chair? 5 5 5 5   In the past 7 days, how often have you been bothered by emotional problems such as feeling anxious, depressed, or irritable? 4 4 3 3   In the past 7 days, how would you rate your fatigue on average? 3 3 3 3   In the past 7 days, how would you rate your pain on average, where 0 means no pain, and 10 means worst imaginable pain? 0 0 0 3   Global Mental Health Score 12 12 13 13   Global Physical Health Score 17 17 17 14   PROMIS TOTAL - SUBSCORES 29 29 30 27   Some recent data might be hidden     PROMIS 10-Global Health (only subscores and total score):   PROMIS-10 Scores Only 12/30/2021 1/11/2022 4/12/2022 7/14/2022   Global Mental Health Score 12 12 13 13   Global Physical Health Score 17 17 17 14   PROMIS TOTAL - SUBSCORES 29 29 30 27     Beason Suicide Severity Rating Scale (Lifetime/Recent)  Beason Suicide Severity Rating (Lifetime/Recent) 1/5/2022   Wish to be Dead (Lifetime) No   Non-Specific Active Suicidal Thoughts (Lifetime) No   Has subject engaged in non-suicidal self-injurious behavior? (Lifetime) No         ASSESSMENT: Current Emotional / Mental Status (status of significant symptoms):   Risk status (Self / Other harm or suicidal ideation)   Patient denies current fears or concerns for personal safety.   Patient denies current or recent suicidal ideation or behaviors.   Patient denies current or recent homicidal ideation or behaviors.   Patient denies current or recent self injurious behavior or ideation.   Patient denies other safety concerns.   Patient reports there has been no change in risk factors since their last session.     Patient reports there has been no change in protective factors since their last session.     Recommended  that patient call 911 or go to the local ED should there be a change in any of these risk factors.     Appearance:   Appropriate    Eye Contact:   Good    Psychomotor Behavior: Normal    Attitude:   Cooperative    Orientation:   All   Speech    Rate / Production: Normal     Volume:  Normal    Mood:    Normal   Affect:    Appropriate    Thought Content:  Clear    Thought Form:  Circumstantial   Insight:    Intellectual Insight     Medication Review:   No current psychiatric medications prescribed     Medication Compliance:   NA     Changes in Health Issues:   None reported     Chemical Use Review:   Substance Use: Chemical use reviewed, no active concerns identified      Tobacco Use: No current tobacco use.      Diagnosis:  1. Adjustment disorder with mixed anxiety and depressed mood        Collateral Reports Completed:   Not Applicable    PLAN: (Patient Tasks / Therapist Tasks / Other)  Patient will practice setting boundaries with adult children and provide recommendations on family/ individual therapy and family schedule. Patient will use coping skills to manage strong emotions.      YECENIA Santamaria, LGSW    This note has been reviewed and I agree with the plan of care. This note is co-signed by Carlota Bush [facility] Super[facility], on: October 3, 2022                                                ______________________________________________________________________    Individual Treatment Plan    Patient's Name: Mima Barrett  YOB: 1945    Date of Creation: 1/13/2022  Date Treatment Plan Last Reviewed/Revised: 7/14/2022    DSM5 Diagnoses: Adjustment Disorders  309.28 (F43.23) With mixed anxiety and depressed mood  Psychosocial / Contextual Factors: family friction   PROMIS (reviewed every 90 days): 7/14/2022 score 30     Referral / Collaboration:  Referral to another professional/service is not indicated at this time..    Anticipated number of session for this episode of care:  10  Anticipation frequency of session: Every 3 months  Anticipated Duration of each session: 38-52 minutes  Treatment plan will be reviewed in 90 days or when goals have been changed.     MeasurableTreatment Goal(s) related to diagnosis / functional impairment(s)  Goal 1: Patient will gain skills and education to manage depression in a healthy manner, as measured by PHQ-9.    I will know I've met my goal when I feel rested in the morning, not fatigued, and receive 7-8 hours of sleep,       Objective #A (Patient Action)    Patient will identify 3-5 stressors which contribute to feelings of depression.  Status: Completed - Date: 4/13/2022     Intervention(s)  Therapist will assign homework identifying feelings of depression.    Objective #B  Patient will identify 3-5 strategies to more effectively address stressors.  Status: Completed - Date: 4/13/2022     Intervention(s)  Therapist will teach healthy coping skills.    Objective #C  Patient will participate in 3-5 activities to improve mood.  Status: Continued - Date(s):7/14/2022     Intervention(s)  Therapist will assign homework involving activities to improve mood.      Goal 2: Patient will manage anxiety in healthy ways measured by DAVID-7.     I will know I've met my goal when my anxious thoughts no longer consumes my day      Objective #A (Patient Action)    Status: Continued - Date(s): 7/14/2022     Patient will Identify negative self-talk and behaviors: challenge core beliefs, myths, and actions.    Intervention(s)  Therapist will teach positive self talk.    Objective #B  Patient will identify 3-5 stressors which contribute to feelings of anxiety.    Status: Completed - Date: 4/13/2022      Intervention(s)  Therapist will assign homework identifying anxious thoughts/ fears .    Objective #C  Patient will use at least 3-5 coping skills for anxiety management in the next 5 weeks.  Status: Completed - Date: 4/13/2022     Intervention(s)  Therapist will teach  emotional regulation skills. Anxiety coping mechanisms .        Chante Goncalves MSAMBERLY, SW   July 14, 2022  This note has been reviewed and I agree with the treatment plan of care. This note is co-signed by Carlota Bush [facility] Supervisor, on: July 14, 2022

## 2022-10-16 ENCOUNTER — HEALTH MAINTENANCE LETTER (OUTPATIENT)
Age: 77
End: 2022-10-16

## 2022-10-20 NOTE — PROGRESS NOTES
Mima is a 77 year old  female who presents for annual exam.     Besides routine health maintenance,  she would like to discuss OSteoprosisi meds, dexa scan and flu shot.    Do you have a Health Care Directive?: Yes, advance care planning is on file.    Fall risk:   Fallen 2 or more times in the past year?: No  Any fall with injury in the past year?: No    HPI:  The patient's PCP is  Damián Fowler MD.      Got covid 4wk ago.     Would like Influenza vaccine.     Had GB removed this past summer.     Walks her dog twice per day. But knows she can get more exercise.     Colonoscopy scheduled next week.    Not bothered by her prolapse.    GYNECOLOGIC HISTORY:  No LMP recorded. Patient is postmenopausal..   reports that she has never smoked. She has never used smokeless tobacco.    Patient is sexually active.  STD testing offered?  Declined  Last PHQ-9 score on record=   PHQ-9 SCORE 10/27/2022   PHQ-9 Total Score MyChart -   PHQ-9 Total Score 3     Last GAD7 score on record=   DAVID-7 SCORE 2022 3/4/2022 10/27/2022   Total Score - - -   Total Score 8 9 2     Alcohol Score = 3    HEALTH MAINTENANCE:  Cholesterol:  Recent Labs   Lab Test 08/10/20  0957 18  0933   CHOL 283* 250*   HDL 65 76   * 155*   TRIG 176* 94      Last Mammo: One year ago, Result: Normal, Next Mammo: Today   Pap: (No results found for: GYNINTERP, PAP )  DEXA:  2020  Colonoscopy:  2019, Result:  Normal, Next Colonoscopy: 10 years.    Health maintenance updated:  yes    HISTORY:  OB History    Para Term  AB Living   2 2 2 0 0 2   SAB IAB Ectopic Multiple Live Births   0 0 0 0 2      # Outcome Date GA Lbr Juan Pablo/2nd Weight Sex Delivery Anes PTL Lv   2 Term         MANOHAR   1 Term         MANOHAR     Patient Active Problem List   Diagnosis     Hyperlipidemia LDL goal <130     Pap smear of cervix not needed     Past Surgical History:   Procedure Laterality Date     cryotherapy      No AIDAN 2/3 since      "ENDOMETRIAL SAMPLING (BIOPSY)  4/97;11/98;4/00    '97-Prolif endo;'98-prolif endo;4/00-prolif endo     OTHER SURGICAL HISTORY  1977    REMOVAL NODULES ON VOCAL CORDS      Social History     Tobacco Use     Smoking status: Never     Smokeless tobacco: Never   Substance Use Topics     Alcohol use: Yes     Alcohol/week: 0.0 standard drinks     Comment: 3 times/wk      Problem (# of Occurrences) Relation (Name,Age of Onset)    Osteoporosis (2) Mother, Maternal Grandmother    Colon Polyps (1) Father    Fractures (1) Mother (83): hip; had previously had radiation therapy for uterine cancer    Uterine Cancer (1) Mother            Current Outpatient Medications   Medication Sig     alendronate (FOSAMAX) 70 MG tablet TAKE 1 TABLET BY MOUTH WITH 8 OZ OF WATER EVERY 7 DAYS 30 MINUTES BEFORE BREAKFAST AND REMAIN UPRIGHT DURING THIS TIME     calcium-vitamin D (CALTRATE) 600-400 MG-UNIT per tablet Take 1 tablet by mouth daily. Takes 2 tablets in am      Multiple Vitamins-Minerals (MULTIVITAMIN & MINERAL PO) Take 1 tablet by mouth daily.     SHINGRIX injection      aspirin (ASA) 81 MG chewable tablet Take 81 mg by mouth daily (Patient not taking: Reported on 10/27/2022)     No current facility-administered medications for this visit.       No Known Allergies    Past medical, surgical, social and family history were reviewed and updated in EPIC.    ROS:   12 point review of systems negative other than symptoms noted below or in the HPI.  No urinary frequency or dysuria, bladder or kidney problems    EXAM:  /60   Ht 1.664 m (5' 5.5\")   Wt 67.6 kg (149 lb)   BMI 24.42 kg/m     BMI: Body mass index is 24.42 kg/m .    EXAM:  Constitutional: Appearance: Well nourished, well developed alert, in no acute distress  Neck:  Lymph Nodes:  No lymphadenopathy present    Thyroid:  Gland size normal, nontender, no nodules or masses present  on palpation  Chest:  Respiratory Effort:  Breathing " unlabored  Cardiovascular:Heart    Auscultation:  Regular rate, normal rhythm, no murmurs present  Breasts: Inspection of Breasts:  No lymphadenopathy present., Palpation of Breasts and Axillae:  No masses present on palpation, no breast tenderness., Axillary Lymph Nodes:  No lymphadenopathy present. and No nodularity, asymmetry or nipple discharge bilaterally.  Gastrointestinal:  Abdominal Examination:  Abdomen nontender to palpation, tone normal without     rigidity or guarding, no masses present, umbilicus without lesions    Liver and speen:  No hepatomegaly present, liver nontender to palpation    Hernias:  No hernias present  Lymphatic: Lymph Nodes:  No other lymphadenopathy present  Skin:  General Inspection:  No rashes present, no lesions present, no areas of  discoloration.    Genitalia and Groin:  No rashes present, no lesions present, no areas of  discoloration, no masses present  Neurologic/Psychiatric:    Mental Status:  Oriented X3     Pelvic Exam:  External Genitalia:     Normal appearance for age, no discharge present, no tenderness present, no inflammatory lesions present, color normal  Vagina:     Normal vaginal vault without central or paravaginal defects, no discharge present, no inflammatory lesions present, no masses present  Bladder:     Nontender to palpation  Urethra:   Urethral Body:  Urethra palpation normal, urethra structural support normal   Urethral Meatus:  No erythema or lesions present  Cervix:     Appearance healthy, no lesions present, nontender to palpation, no bleeding present  Uterus:     Uterus: firm, normal sized and nontender, midplane in position.   Adnexa:     No adnexal tenderness present, no adnexal masses present  Perineum:     Perineum within normal limits, no evidence of trauma, no rashes or skin lesions present  Anus:     Anus within normal limits, no hemorrhoids present  Inguinal Lymph Nodes:     No lymphadenopathy present  Pubic Hair:     Normal pubic hair  distribution for age  Genitalia and Groin:     No rashes present, no lesions present, no areas of discoloration, no masses present      COUNSELING:   Reviewed preventive health counseling, as reflected in patient instructions       Osteoporosis prevention/bone health    BMI:  Body mass index is 24.42 kg/m .     reports that she has never smoked. She has never used smokeless tobacco.      ASSESSMENT:  77 year old female with satisfactory annual exam.    ICD-10-CM    1. Encounter for gynecological examination without abnormal finding  Z01.419 DX Hip/Pelvis/Spine     Lipid panel reflex to direct LDL Fasting     Glucose     Hemoglobin A1c     Lipid panel reflex to direct LDL Fasting     Glucose     Hemoglobin A1c      2. Osteoporosis, senile  M81.0 DX Hip/Pelvis/Spine     alendronate (FOSAMAX) 70 MG tablet      3. Elevated fasting glucose  R73.01 Glucose     Hemoglobin A1c     Glucose     Hemoglobin A1c      4. Encounter for lipid screening for cardiovascular disease  Z13.220 Lipid panel reflex to direct LDL Fasting    Z13.6 Lipid panel reflex to direct LDL Fasting      5. Need for prophylactic vaccination and inoculation against influenza  Z23 INFLUENZA, QUAD, HIGH DOSE, PF, 65YR + (FLUZONE HD)      6. Pap smear of cervix not needed  Z53.8           PLAN:  -No further need for cervical cancer screening.   -Breast self awareness discussed.   -Colonoscopy due  -Osteoporosis. UTD 2020 Dexa. Refill fosamx x 1 additional year to complete 5yr of therapy. Will repeat next dexa 1yr  -Due for repeat labs, has hx of elevated cholesterol and glucose. Rec PCP manage this. She will discuss with her PCP  -PMB precuations  -Influenza vaccine desired  -Return one year for next annual exam          Kirstin Martínezs, DO

## 2022-10-27 ENCOUNTER — ANCILLARY PROCEDURE (OUTPATIENT)
Dept: MAMMOGRAPHY | Facility: CLINIC | Age: 77
End: 2022-10-27
Payer: MEDICARE

## 2022-10-27 ENCOUNTER — OFFICE VISIT (OUTPATIENT)
Dept: OBGYN | Facility: CLINIC | Age: 77
End: 2022-10-27
Payer: MEDICARE

## 2022-10-27 ENCOUNTER — ANCILLARY PROCEDURE (OUTPATIENT)
Dept: BONE DENSITY | Facility: CLINIC | Age: 77
End: 2022-10-27
Attending: OBSTETRICS & GYNECOLOGY
Payer: MEDICARE

## 2022-10-27 VITALS
HEIGHT: 66 IN | DIASTOLIC BLOOD PRESSURE: 60 MMHG | BODY MASS INDEX: 23.95 KG/M2 | SYSTOLIC BLOOD PRESSURE: 110 MMHG | WEIGHT: 149 LBS

## 2022-10-27 DIAGNOSIS — R73.01 ELEVATED FASTING GLUCOSE: ICD-10-CM

## 2022-10-27 DIAGNOSIS — Z01.419 ENCOUNTER FOR GYNECOLOGICAL EXAMINATION WITHOUT ABNORMAL FINDING: ICD-10-CM

## 2022-10-27 DIAGNOSIS — Z13.220 ENCOUNTER FOR LIPID SCREENING FOR CARDIOVASCULAR DISEASE: ICD-10-CM

## 2022-10-27 DIAGNOSIS — Z12.31 VISIT FOR SCREENING MAMMOGRAM: ICD-10-CM

## 2022-10-27 DIAGNOSIS — Z53.8 PAP SMEAR OF CERVIX NOT NEEDED: ICD-10-CM

## 2022-10-27 DIAGNOSIS — Z13.6 ENCOUNTER FOR LIPID SCREENING FOR CARDIOVASCULAR DISEASE: ICD-10-CM

## 2022-10-27 DIAGNOSIS — M81.0 OSTEOPOROSIS, SENILE: ICD-10-CM

## 2022-10-27 DIAGNOSIS — Z01.419 ENCOUNTER FOR GYNECOLOGICAL EXAMINATION WITHOUT ABNORMAL FINDING: Primary | ICD-10-CM

## 2022-10-27 DIAGNOSIS — Z23 NEED FOR PROPHYLACTIC VACCINATION AND INOCULATION AGAINST INFLUENZA: ICD-10-CM

## 2022-10-27 LAB — HBA1C MFR BLD: 5.7 % (ref 0–5.6)

## 2022-10-27 PROCEDURE — 80061 LIPID PANEL: CPT | Performed by: OBSTETRICS & GYNECOLOGY

## 2022-10-27 PROCEDURE — 77063 BREAST TOMOSYNTHESIS BI: CPT | Mod: TC | Performed by: RADIOLOGY

## 2022-10-27 PROCEDURE — 77067 SCR MAMMO BI INCL CAD: CPT | Mod: TC | Performed by: RADIOLOGY

## 2022-10-27 PROCEDURE — 90662 IIV NO PRSV INCREASED AG IM: CPT | Performed by: OBSTETRICS & GYNECOLOGY

## 2022-10-27 PROCEDURE — G0008 ADMIN INFLUENZA VIRUS VAC: HCPCS | Performed by: OBSTETRICS & GYNECOLOGY

## 2022-10-27 PROCEDURE — 77080 DXA BONE DENSITY AXIAL: CPT | Performed by: OBSTETRICS & GYNECOLOGY

## 2022-10-27 PROCEDURE — 99397 PER PM REEVAL EST PAT 65+ YR: CPT | Mod: 25 | Performed by: OBSTETRICS & GYNECOLOGY

## 2022-10-27 PROCEDURE — 36415 COLL VENOUS BLD VENIPUNCTURE: CPT | Performed by: OBSTETRICS & GYNECOLOGY

## 2022-10-27 PROCEDURE — 83036 HEMOGLOBIN GLYCOSYLATED A1C: CPT | Performed by: OBSTETRICS & GYNECOLOGY

## 2022-10-27 PROCEDURE — 82947 ASSAY GLUCOSE BLOOD QUANT: CPT | Performed by: OBSTETRICS & GYNECOLOGY

## 2022-10-27 RX ORDER — ALENDRONATE SODIUM 70 MG/1
TABLET ORAL
Qty: 12 TABLET | Refills: 5 | Status: SHIPPED | OUTPATIENT
Start: 2022-10-27 | End: 2023-11-02

## 2022-10-27 ASSESSMENT — ANXIETY QUESTIONNAIRES
1. FEELING NERVOUS, ANXIOUS, OR ON EDGE: SEVERAL DAYS
5. BEING SO RESTLESS THAT IT IS HARD TO SIT STILL: NOT AT ALL
7. FEELING AFRAID AS IF SOMETHING AWFUL MIGHT HAPPEN: NOT AT ALL
3. WORRYING TOO MUCH ABOUT DIFFERENT THINGS: SEVERAL DAYS
6. BECOMING EASILY ANNOYED OR IRRITABLE: NOT AT ALL
2. NOT BEING ABLE TO STOP OR CONTROL WORRYING: NOT AT ALL
IF YOU CHECKED OFF ANY PROBLEMS ON THIS QUESTIONNAIRE, HOW DIFFICULT HAVE THESE PROBLEMS MADE IT FOR YOU TO DO YOUR WORK, TAKE CARE OF THINGS AT HOME, OR GET ALONG WITH OTHER PEOPLE: NOT DIFFICULT AT ALL
GAD7 TOTAL SCORE: 2
GAD7 TOTAL SCORE: 2

## 2022-10-27 ASSESSMENT — PATIENT HEALTH QUESTIONNAIRE - PHQ9
SUM OF ALL RESPONSES TO PHQ QUESTIONS 1-9: 3
5. POOR APPETITE OR OVEREATING: NOT AT ALL

## 2022-10-27 NOTE — PATIENT INSTRUCTIONS
-Daily total calcium intake (between food/supplements) should be 1200mg which equates to 5 servings calcium containing food per day; VItamin D 1000IU.   Foods rich in calcium are: milk, cheese, yogurt, seafood, sardines and canned salmon, leafy green vegetables such as melida greens, spinach and kale, beans and lentils, almonds, seeds (poppy, sesame, celery, heide), rhubarb, dried fruit such as figs, whey protein, tofu and edamame, amaranth, other foods with added calcium such as orange juice and some cereals.   If adequate amount not taken in diet, then a supplement may be needed.     -I also recommend increasing your dietary fiber by starting Metamucil (powder mixed in glass of water) once to twice daily

## 2022-10-28 LAB
CHOLEST SERPL-MCNC: 247 MG/DL
FASTING STATUS PATIENT QL REPORTED: YES
FASTING STATUS PATIENT QL REPORTED: YES
GLUCOSE BLD-MCNC: 104 MG/DL (ref 70–99)
HDLC SERPL-MCNC: 68 MG/DL
LDLC SERPL CALC-MCNC: 160 MG/DL
NONHDLC SERPL-MCNC: 179 MG/DL
TRIGL SERPL-MCNC: 97 MG/DL

## 2022-10-31 ENCOUNTER — TELEPHONE (OUTPATIENT)
Dept: OBGYN | Facility: CLINIC | Age: 77
End: 2022-10-31

## 2022-10-31 ENCOUNTER — VIRTUAL VISIT (OUTPATIENT)
Dept: PSYCHOLOGY | Facility: CLINIC | Age: 77
End: 2022-10-31
Payer: MEDICARE

## 2022-10-31 DIAGNOSIS — F43.23 ADJUSTMENT DISORDER WITH MIXED ANXIETY AND DEPRESSED MOOD: Primary | ICD-10-CM

## 2022-10-31 PROCEDURE — 90837 PSYTX W PT 60 MINUTES: CPT | Mod: 95

## 2022-10-31 NOTE — PROGRESS NOTES
[facility]                                      Progress Note    Patient Name: Mima Barrett  Date: 10/31/2022         Service Type: Individual      Session Start Time: 09:04 AM  Session End Time: 09:57     Session Length: 53 mins    Session #: 17    Attendees: Client attended alone    Service Modality:  Video Visit:      Provider verified identity through the following two step process.  Patient provided:  Patient  and Patient is known previously to provider    Telemedicine Visit: The patient's condition can be safely assessed and treated via synchronous audio and visual telemedicine encounter.      Reason for Telemedicine Visit: Patient has requested telehealth visit    Originating Site (Patient Location): Patient's home    Distant Site (Provider Location): Provider Remote Setting- Home Office    Consent:  The patient/guardian has verbally consented to: the potential risks and benefits of telemedicine (video visit) versus in person care; bill my insurance or make self-payment for services provided; and responsibility for payment of non-covered services.     Patient would like the video invitation sent by:  My Chart    Mode of Communication:  Video Conference via Amwell    As the provider I attest to compliance with applicable laws and regulations related to telemedicine.    DATA  Extended Session (53+ minutes): PROLONGED SERVICE IN THE OUTPATIENT SETTING REQUIRING DIRECT (FACE-TO-FACE) PATIENT CONTACT BEYOND THE USUAL SERVICE:    - Patient's presenting concerns require more intensive intervention than could be completed within the usual service  Interactive Complexity: No  Crisis: No     Progress Since Last Session (Related to Symptoms / Goals / Homework):   Symptoms: No change stable    Homework: Achieved / completed to satisfaction      Episode of Care Goals: Satisfactory progress - ACTION (Actively working towards change); Intervened by reinforcing change plan / affirming steps  "taken     Current / Ongoing Stressors and Concerns:   The reason for seeking services at this time is: \"Difficult family situation dealing with probable daughter's divorce\" \"stress and anxiety) The problem(s) began 06/20/21.   Healthy boundaries      Treatment Objective(s) Addressed in This Session:   identify 1-2 strategies to more effectively address stressors       Intervention:   ACT -  Therapist met with patient to review goals and interventions. Therapist utilized reflected listening as patient gave brief reflection of week. Patient discussed difficulty with setting boundaries with adult children. Patient processing guilt if obliged to say yes in situations when she wants to say no. Discussed healthy boundaries. Therapist normalized difficulty with transitions within family structures.  Therapist encouraged patient to examine what it most challenging with setting healthy boundaries. Reviewed homework assignment.     Assessments completed prior to visit:  The following assessments were completed by patient for this visit:  PHQ2:   PHQ-2 ( 1999 Pfizer) 10/27/2022 8/3/2022 5/3/2022 1/13/2022 1/5/2022 10/4/2021 8/10/2020   Q1: Little interest or pleasure in doing things 0 1 1 1 1 0 1   Q2: Feeling down, depressed or hopeless 1 1 2 1 1 0 1   PHQ-2 Score 1 2 3 2 2 0 2   PHQ-2 Total Score (12-17 Years)- Positive if 3 or more points; Administer PHQ-A if positive - - - - - 0 2   Q1: Little interest or pleasure in doing things - Several days Several days Several days Several days - -   Q2: Feeling down, depressed or hopeless - Several days More than half the days Several days Several days - -   PHQ-2 Score - 2 3 2 2 - -     PHQ9:   PHQ-9 SCORE 8/10/2020 10/4/2021 1/5/2022 2/4/2022 3/4/2022 5/3/2022 10/27/2022   PHQ-9 Total Score MyChart - - - - - 7 (Mild depression) -   PHQ-9 Total Score 2 2 7 4 4 7 3     GAD2:   DAVID-2 12/30/2021 1/11/2022 4/12/2022 7/14/2022 10/31/2022   Feeling nervous, anxious, or on edge 3 2 1 1 1 "   Not being able to stop or control worrying 3 2 1 1 1   DAVID-2 Total Score 6 4 2 2 2     GAD7:   DAVID-7 SCORE 8/8/2019 8/10/2020 10/4/2021 12/30/2021 2/4/2022 3/4/2022 10/27/2022   Total Score - - - 12 (moderate anxiety) - - -   Total Score 0 2 0 12 8 9 2     CAGE-AID:   CAGE-AID Total Score 12/30/2021   Total Score 0   Total Score MyChart 0 (A total score of 2 or greater is considered clinically significant)     PROMIS 10-Global Health (all questions and answers displayed):   PROMIS 10 12/30/2021 1/11/2022 4/12/2022 7/14/2022 10/31/2022   In general, would you say your health is: Very good Very good Good Good Very good   In general, would you say your quality of life is: Excellent Very good Very good Very good Very good   In general, how would you rate your physical health? Very good Very good Very good Fair Very good   In general, how would you rate your mental health, including your mood and your ability to think? Fair Good Good Good Good   In general, how would you rate your satisfaction with your social activities and relationships? Good Good Good Good Good   In general, please rate how well you carry out your usual social activities and roles Very good Fair Good Very good Good   To what extent are you able to carry out your everyday physical activities such as walking, climbing stairs, carrying groceries, or moving a chair? Completely Completely Completely Completely Completely   How often have you been bothered by emotional problems such as feeling anxious, depressed or irritable? Often Often Sometimes Sometimes Sometimes   How would you rate your fatigue on average? Moderate Moderate Moderate Moderate Mild   How would you rate your pain on average?   0 = No Pain  to  10 = Worst Imaginable Pain 0 0 0 3 0   In general, would you say your health is: 4 4 3 3 4   In general, would you say your quality of life is: 5 4 4 4 4   In general, how would you rate your physical health? 4 4 4 2 4   In general, how would you  rate your mental health, including your mood and your ability to think? 2 3 3 3 3   In general, how would you rate your satisfaction with your social activities and relationships? 3 3 3 3 3   In general, please rate how well you carry out your usual social activities and roles. (This includes activities at home, at work and in your community, and responsibilities as a parent, child, spouse, employee, friend, etc.) 4 2 3 4 3   To what extent are you able to carry out your everyday physical activities such as walking, climbing stairs, carrying groceries, or moving a chair? 5 5 5 5 5   In the past 7 days, how often have you been bothered by emotional problems such as feeling anxious, depressed, or irritable? 4 4 3 3 3   In the past 7 days, how would you rate your fatigue on average? 3 3 3 3 2   In the past 7 days, how would you rate your pain on average, where 0 means no pain, and 10 means worst imaginable pain? 0 0 0 3 0   Global Mental Health Score 12 12 13 13 13   Global Physical Health Score 17 17 17 14 18   PROMIS TOTAL - SUBSCORES 29 29 30 27 31   Some recent data might be hidden     PROMIS 10-Global Health (only subscores and total score):   PROMIS-10 Scores Only 12/30/2021 1/11/2022 4/12/2022 7/14/2022 10/31/2022   Global Mental Health Score 12 12 13 13 13   Global Physical Health Score 17 17 17 14 18   PROMIS TOTAL - SUBSCORES 29 29 30 27 31     [place] Suicide Severity Rating Scale (Lifetime/Recent)  [place] Suicide Severity Rating (Lifetime/Recent) 1/5/2022   Wish to be Dead (Lifetime) No   Non-Specific Active Suicidal Thoughts (Lifetime) No   Has subject engaged in non-suicidal self-injurious behavior? (Lifetime) No         ASSESSMENT: Current Emotional / Mental Status (status of significant symptoms):   Risk status (Self / Other harm or suicidal ideation)   Patient denies current fears or concerns for personal safety.   Patient denies current or recent suicidal ideation or behaviors.   Patient denies  current or recent homicidal ideation or behaviors.   Patient denies current or recent self injurious behavior or ideation.   Patient denies other safety concerns.   Patient reports there has been no change in risk factors since their last session.     Patient reports there has been no change in protective factors since their last session.     Recommended that patient call 911 or go to the local ED should there be a change in any of these risk factors.     Appearance:   Appropriate    Eye Contact:   Good    Psychomotor Behavior: Normal    Attitude:   Cooperative    Orientation:   All   Speech    Rate / Production: Normal     Volume:  Normal    Mood:    Normal   Affect:    Appropriate    Thought Content:  Clear    Thought Form:  Circumstantial   Insight:    Good  and Intellectual Insight     Medication Review:   No current psychiatric medications prescribed     Medication Compliance:   NA     Changes in Health Issues:   None reported     Chemical Use Review:   Substance Use: Chemical use reviewed, no active concerns identified      Tobacco Use: No current tobacco use.      Diagnosis:  1. Adjustment disorder with mixed anxiety and depressed mood        Collateral Reports Completed:   Not Applicable    PLAN: (Patient Tasks / Therapist Tasks / Other)  Patient will make a list of specific boundaries she would like to have with adult children.  Specific actions she can take to improve boundaries and how would her life be difficult if boundaries were implemented.     Will update treatment plan at next follow up session.     YECENIA Santamaria, LGSW       This note has been reviewed and I agree with the plan of care. This note is co-signed by Carlota Bush [facility] Care[facility] Supervisor, on: November 7, 2022                                             ______________________________________________________________________    Individual Treatment Plan    Patient's Name: Mima Barrett  YOB: 1945    Date of  Creation: 1/13/2022  Date Treatment Plan Last Reviewed/Revised: 7/14/2022    DSM5 Diagnoses: Adjustment Disorders  309.28 (F43.23) With mixed anxiety and depressed mood  Psychosocial / Contextual Factors: family friction   PROMIS (reviewed every 90 days): 7/14/2022 score 30     Referral / Collaboration:  Referral to another professional/service is not indicated at this time..    Anticipated number of session for this episode of care: 10  Anticipation frequency of session: Every 3 months  Anticipated Duration of each session: 38-52 minutes  Treatment plan will be reviewed in 90 days or when goals have been changed.     MeasurableTreatment Goal(s) related to diagnosis / functional impairment(s)  Goal 1: Patient will gain skills and education to manage depression in a healthy manner, as measured by PHQ-9.    I will know I've met my goal when I feel rested in the morning, not fatigued, and receive 7-8 hours of sleep,       Objective #A (Patient Action)    Patient will identify 3-5 stressors which contribute to feelings of depression.  Status: Completed - Date: 4/13/2022     Intervention(s)  Therapist will assign homework identifying feelings of depression.    Objective #B  Patient will identify 3-5 strategies to more effectively address stressors.  Status: Completed - Date: 4/13/2022     Intervention(s)  Therapist will teach healthy coping skills.    Objective #C  Patient will participate in 3-5 activities to improve mood.  Status: Continued - Date(s):7/14/2022     Intervention(s)  Therapist will assign homework involving activities to improve mood.      Goal 2: Patient will manage anxiety in healthy ways measured by DAVID-7.     I will know I've met my goal when my anxious thoughts no longer consumes my day      Objective #A (Patient Action)    Status: Continued - Date(s): 7/14/2022     Patient will Identify negative self-talk and behaviors: challenge core beliefs, myths, and actions.    Intervention(s)  Therapist will  teach positive self talk.    Objective #B  Patient will identify 3-5 stressors which contribute to feelings of anxiety.    Status: Completed - Date: 4/13/2022      Intervention(s)  Therapist will assign homework identifying anxious thoughts/ fears .    Objective #C  Patient will use at least 3-5 coping skills for anxiety management in the next 5 weeks.  Status: Completed - Date: 4/13/2022     Intervention(s)  Therapist will teach emotional regulation skills. Anxiety coping mechanisms .        Chante KEENE, SW   July 14, 2022  This note has been reviewed and I agree with the treatment plan of care. This note is co-signed by Carlota Bush [facility] Supervisor, on: July 14, 2022

## 2022-11-02 ENCOUNTER — OFFICE VISIT (OUTPATIENT)
Dept: OBGYN | Facility: CLINIC | Age: 77
End: 2022-11-02
Payer: MEDICARE

## 2022-11-02 VITALS
SYSTOLIC BLOOD PRESSURE: 122 MMHG | BODY MASS INDEX: 24.11 KG/M2 | DIASTOLIC BLOOD PRESSURE: 60 MMHG | WEIGHT: 150 LBS | HEIGHT: 66 IN

## 2022-11-02 DIAGNOSIS — M25.552 LEFT HIP PAIN: ICD-10-CM

## 2022-11-02 DIAGNOSIS — M81.0 OSTEOPOROSIS, SENILE: Primary | ICD-10-CM

## 2022-11-02 DIAGNOSIS — R73.01 ELEVATED FASTING GLUCOSE: ICD-10-CM

## 2022-11-02 DIAGNOSIS — R19.7 DIARRHEA, UNSPECIFIED TYPE: ICD-10-CM

## 2022-11-02 PROCEDURE — 82306 VITAMIN D 25 HYDROXY: CPT | Performed by: OBSTETRICS & GYNECOLOGY

## 2022-11-02 PROCEDURE — 99214 OFFICE O/P EST MOD 30 MIN: CPT | Performed by: OBSTETRICS & GYNECOLOGY

## 2022-11-02 PROCEDURE — 36415 COLL VENOUS BLD VENIPUNCTURE: CPT | Performed by: OBSTETRICS & GYNECOLOGY

## 2022-11-02 PROCEDURE — 80053 COMPREHEN METABOLIC PANEL: CPT | Performed by: OBSTETRICS & GYNECOLOGY

## 2022-11-02 NOTE — PATIENT INSTRUCTIONS
-Daily total calcium intake (between food/supplements) should be 1200mg which equates to 5 servings calcium containing food per day; VItamin D 2000IU.   Foods rich in calcium are: milk, cheese, yogurt, seafood, sardines and canned salmon, leafy green vegetables such as melida greens, spinach and kale, beans and lentils, almonds, seeds (poppy, sesame, celery, heide), rhubarb, dried fruit such as figs, whey protein, tofu and edamame, amaranth, other foods with added calcium such as orange juice and some cereals.   If adequate amount not taken in diet, then a supplement may be needed.     -I also recommend increasing your dietary fiber by starting Metamucil (powder mixed in glass of water) once to twice daily

## 2022-11-02 NOTE — PROGRESS NOTES
SUBJECTIVE:                                                   Mima Barrett is a 77 year old female who presents to clinic today for the following health issue(s):  Patient presents with:  Consult  Follow Up: Patient is here to discuss DEXA results and medication.    Additional information: patient also had recent lab work and does have an appointment with primary care provider for follow-up    HPI:    Does notice more aching in left hip than other side.   Has recently started walking more in last week.     Has appt with PCP 22.     Mom had , fractured a bone,  in surgery. She also had hx of uterine ca so she got pelvic/abd radiation.     Has been having more diarrhea since GB removed July. Will have it two times per week, about every 3 days.   Sometimes fruit or chocolate triggers.     No LMP recorded. Patient is postmenopausal..   Patient is sexually active, .   reports that she has never smoked. She has never used smokeless tobacco.  Health maintenance updated:  Immunizations reviewed, patient has to hold off on bi-valent booster due to recent COVID positive 3 weeks ago      Today's PHQ-2 Score:   PHQ-2 (  Pfizer) 10/27/2022   Q1: Little interest or pleasure in doing things 0   Q2: Feeling down, depressed or hopeless 1   PHQ-2 Score 1   PHQ-2 Total Score (12-17 Years)- Positive if 3 or more points; Administer PHQ-A if positive -   Q1: Little interest or pleasure in doing things -   Q2: Feeling down, depressed or hopeless -   PHQ-2 Score -     Today's PHQ-9 Score:   PHQ-9 SCORE 10/27/2022   PHQ-9 Total Score MyChart -   PHQ-9 Total Score 3     Today's DAVID-7 Score:   DAVID-7 SCORE 10/27/2022   Total Score -   Total Score 2       Problem list and histories reviewed & adjusted, as indicated.  Additional history: as documented.    Patient Active Problem List   Diagnosis     Hyperlipidemia LDL goal <130     Pap smear of cervix not needed     Past Surgical History:   Procedure Laterality  "Date     cryotherapy  1978    No AIDAN 2/3 since     ENDOMETRIAL SAMPLING (BIOPSY)  4/97;11/98;4/00 '97-Prolif endo;'98-prolif endo;4/00-prolif endo     OTHER SURGICAL HISTORY  1977    REMOVAL NODULES ON VOCAL CORDS      Social History     Tobacco Use     Smoking status: Never     Smokeless tobacco: Never   Substance Use Topics     Alcohol use: Yes     Alcohol/week: 0.0 standard drinks     Comment: 3 times/wk      Problem (# of Occurrences) Relation (Name,Age of Onset)    Osteoporosis (2) Mother, Maternal Grandmother    Colon Polyps (1) Father    Fractures (1) Mother (83): hip; had previously had radiation therapy for uterine cancer    Uterine Cancer (1) Mother            Current Outpatient Medications   Medication Sig     alendronate (FOSAMAX) 70 MG tablet TAKE 1 TABLET BY MOUTH WITH 8 OZ OF WATER EVERY 7 DAYS 30 MINUTES BEFORE BREAKFAST AND REMAIN UPRIGHT DURING THIS TIME     aspirin (ASA) 81 MG chewable tablet Take 81 mg by mouth daily     calcium-vitamin D (CALTRATE) 600-400 MG-UNIT per tablet Take 1 tablet by mouth daily. Takes 2 tablets in am      Multiple Vitamins-Minerals (MULTIVITAMIN & MINERAL PO) Take 1 tablet by mouth daily.     No current facility-administered medications for this visit.     No Known Allergies    ROS:  12 point review of systems negative other than symptoms noted below or in the HPI.        OBJECTIVE:     /60   Ht 1.664 m (5' 5.5\")   Wt 68 kg (150 lb)   BMI 24.58 kg/m    Body mass index is 24.58 kg/m .    Exam:  Constitutional:  Appearance: Well nourished, well developed alert, in no acute distress  Neurologic:  Mental Status:  Oriented X3.  Normal strength and tone, sensory exam grossly normal, mentation intact and speech normal.    Psychiatric:  Mentation appears normal and affect normal/bright.         ASSESSMENT/PLAN:                                                        ICD-10-CM    1. Osteoporosis, senile  M81.0 Vitamin D Deficiency     Comprehensive metabolic panel " (BMP + Alb, Alk Phos, ALT, AST, Total. Bili, TP)     Vitamin D Deficiency     Comprehensive metabolic panel (BMP + Alb, Alk Phos, ALT, AST, Total. Bili, TP)      2. Left hip pain  M25.552       3. Diarrhea, unspecified type  R19.7       4. Elevated fasting glucose  R73.01 Comprehensive metabolic panel (BMP + Alb, Alk Phos, ALT, AST, Total. Bili, TP)            -Reviewed her results 2018 to current.  On fosamax since 2018. L hip declining despite therapy. Tx may be compromised somewhat now that is having diarrhea. Will work with her PCP and GI to address this. May need to consider change to reclast.   Either way, would discontinue bishposphonate at 5yr of therapy -next year.  -Questions answered, patient happy with plan and feels better after the discussion  -Has appt with PCP coming up, will discuss lipids and abnl glucose/A1c. Will check CMP to help better facilitate their discussion.   Check vit d as well    -Consider consultation with Ortho for left hip. Has hx of bursitis in this hip per patient, has had steroid injection in past.   -Consider nutrition therapy      (36 minutes was spent on the date of the encounter doing chart review, review and interpretation of pertinent test results, history and, documentation, patient counseling.)    Kirstin Bower Masters, DO  [facility]

## 2022-11-02 NOTE — LETTER
2022        RE: Mima Barrett  [address] [place] [address]          SUBJECTIVE:                                                   Mima Barrett is a 77 year old female who presents to clinic today for the following health issue(s):  Patient presents with:  Consult  Follow Up: Patient is here to discuss DEXA results and medication.    Additional information: patient also had recent lab work and does have an appointment with primary care provider for follow-up    HPI:    Does notice more aching in left hip than other side.   Has recently started walking more in last week.     Has appt with PCP 22.     Mom had , fractured a bone,  in surgery. She also had hx of uterine ca so she got pelvic/abd radiation.     Has been having more diarrhea since GB removed July. Will have it two times per week, about every 3 days.   Sometimes fruit or chocolate triggers.     No LMP recorded. Patient is postmenopausal..   Patient is sexually active, .   reports that she has never smoked. She has never used smokeless tobacco.  Health maintenance updated:  Immunizations reviewed, patient has to hold off on bi-valent booster due to recent COVID positive 3 weeks ago      Today's PHQ-2 Score:   PHQ-2 (  Pf[employer]) 10/27/2022   Q1: Little interest or pleasure in doing things 0   Q2: Feeling down, depressed or hopeless 1   PHQ-2 Score 1   PHQ-2 Total Score (12-17 Years)- Positive if 3 or more points; Administer PHQ-A if positive -   Q1: Little interest or pleasure in doing things -   Q2: Feeling down, depressed or hopeless -   PHQ-2 Score -     Today's PHQ-9 Score:   PHQ-9 SCORE 10/27/2022   PHQ-9 Total Score MyChart -   PHQ-9 Total Score 3     Today's DAVID-7 Score:   DAVID-7 SCORE 10/27/2022   Total Score -   Total Score 2       Problem list and histories reviewed & adjusted, as indicated.  Additional history: as documented.    Patient Active Problem List   Diagnosis     Hyperlipidemia LDL goal  "<130     Pap smear of cervix not needed     Past Surgical History:   Procedure Laterality Date     cryotherapy  1978    No AIDAN 2/3 since     ENDOMETRIAL SAMPLING (BIOPSY)  4/97;11/98;4/00 '97-Prolif endo;'98-prolif endo;4/00-prolif endo     OTHER SURGICAL HISTORY  1977    REMOVAL NODULES ON VOCAL CORDS      Social History     Tobacco Use     Smoking status: Never     Smokeless tobacco: Never   Substance Use Topics     Alcohol use: Yes     Alcohol/week: 0.0 standard drinks     Comment: 3 times/wk      Problem (# of Occurrences) Relation (Name,Age of Onset)    Osteoporosis (2) Mother, Maternal Grandmother    Colon Polyps (1) Father    Fractures (1) Mother (83): hip; had previously had radiation therapy for uterine cancer    Uterine Cancer (1) Mother            Current Outpatient Medications   Medication Sig     alendronate (FOSAMAX) 70 MG tablet TAKE 1 TABLET BY MOUTH WITH 8 OZ OF WATER EVERY 7 DAYS 30 MINUTES BEFORE BREAKFAST AND REMAIN UPRIGHT DURING THIS TIME     aspirin (ASA) 81 MG chewable tablet Take 81 mg by mouth daily     calcium-vitamin D (CALTRATE) 600-400 MG-UNIT per tablet Take 1 tablet by mouth daily. Takes 2 tablets in am      Multiple Vitamins-Minerals (MULTIVITAMIN & MINERAL PO) Take 1 tablet by mouth daily.     No current facility-administered medications for this visit.     No Known Allergies    ROS:  12 point review of systems negative other than symptoms noted below or in the HPI.        OBJECTIVE:     /60   Ht 1.664 m (5' 5.5\")   Wt 68 kg (150 lb)   BMI 24.58 kg/m    Body mass index is 24.58 kg/m .    Exam:  Constitutional:  Appearance: Well nourished, well developed alert, in no acute distress  Neurologic:  Mental Status:  Oriented X3.  Normal strength and tone, sensory exam grossly normal, mentation intact and speech normal.    Psychiatric:  Mentation appears normal and affect normal/bright.         ASSESSMENT/PLAN:                                                        ICD-10-CM  "   1. Osteoporosis, senile  M81.0 Vitamin D Deficiency     Comprehensive metabolic panel (BMP + Alb, Alk Phos, ALT, AST, Total. Bili, TP)     Vitamin D Deficiency     Comprehensive metabolic panel (BMP + Alb, Alk Phos, ALT, AST, Total. Bili, TP)      2. Left hip pain  M25.552       3. Diarrhea, unspecified type  R19.7       4. Elevated fasting glucose  R73.01 Comprehensive metabolic panel (BMP + Alb, Alk Phos, ALT, AST, Total. Bili, TP)            -Reviewed her results 2018 to current.  On fosamax since 2018. L hip declining despite therapy. Tx may be compromised somewhat now that is having diarrhea. Will work with her PCP and GI to address this. May need to consider change to reclast.   Either way, would discontinue bishposphonate at 5yr of therapy -next year.  -Questions answered, patient happy with plan and feels better after the discussion  -Has appt with PCP coming up, will discuss lipids and abnl glucose/A1c. Will check CMP to help better facilitate their discussion.   Check vit d as well    -Consider consultation with Ortho for left hip. Has hx of bursitis in this hip per patient, has had steroid injection in past.   -Consider nutrition therapy      (36 minutes was spent on the date of the encounter doing chart review, review and interpretation of pertinent test results, history and, documentation, patient counseling.)    Kirstin Bower Masters, DO  [facility]        Sincerely,        Kirstin Martínezs, DO

## 2022-11-03 LAB
ALBUMIN SERPL-MCNC: 4.1 G/DL (ref 3.4–5)
ALP SERPL-CCNC: 69 U/L (ref 40–150)
ALT SERPL W P-5'-P-CCNC: 27 U/L (ref 0–50)
ANION GAP SERPL CALCULATED.3IONS-SCNC: 6 MMOL/L (ref 3–14)
AST SERPL W P-5'-P-CCNC: 19 U/L (ref 0–45)
BILIRUB SERPL-MCNC: 0.4 MG/DL (ref 0.2–1.3)
BUN SERPL-MCNC: 18 MG/DL (ref 7–30)
CALCIUM SERPL-MCNC: 9.3 MG/DL (ref 8.5–10.1)
CHLORIDE BLD-SCNC: 108 MMOL/L (ref 94–109)
CO2 SERPL-SCNC: 25 MMOL/L (ref 20–32)
CREAT SERPL-MCNC: 0.71 MG/DL (ref 0.52–1.04)
DEPRECATED CALCIDIOL+CALCIFEROL SERPL-MC: 25 UG/L (ref 20–75)
GFR SERPL CREATININE-BSD FRML MDRD: 88 ML/MIN/1.73M2
GLUCOSE BLD-MCNC: 97 MG/DL (ref 70–99)
POTASSIUM BLD-SCNC: 4.6 MMOL/L (ref 3.4–5.3)
PROT SERPL-MCNC: 7.4 G/DL (ref 6.8–8.8)
SODIUM SERPL-SCNC: 139 MMOL/L (ref 133–144)

## 2022-12-08 ENCOUNTER — VIRTUAL VISIT (OUTPATIENT)
Dept: PSYCHOLOGY | Facility: CLINIC | Age: 77
End: 2022-12-08
Payer: MEDICARE

## 2022-12-08 DIAGNOSIS — F43.23 ADJUSTMENT DISORDER WITH MIXED ANXIETY AND DEPRESSED MOOD: Primary | ICD-10-CM

## 2022-12-08 PROCEDURE — 90837 PSYTX W PT 60 MINUTES: CPT | Mod: 95

## 2022-12-08 NOTE — PROGRESS NOTES
[facility]                                      Progress Note    Patient Name: Mima Barrett  Date: 2022         Service Type: Individual      Session Start Time: 9:10 AM  Session End Time: 10:08     Session Length: 58 mins    Session #: 18    Attendees: Client attended alone    Service Modality:  Video Visit:      Provider verified identity through the following two step process.  Patient provided:  Patient  and Patient is known previously to provider    Telemedicine Visit: The patient's condition can be safely assessed and treated via synchronous audio and visual telemedicine encounter.      Reason for Telemedicine Visit: Patient has requested telehealth visit    Originating Site (Patient Location): Patient's home    Distant Site (Provider Location): Provider Remote Setting- Home Office    Consent:  The patient/guardian has verbally consented to: the potential risks and benefits of telemedicine (video visit) versus in person care; bill my insurance or make self-payment for services provided; and responsibility for payment of non-covered services.     Patient would like the video invitation sent by:  My Chart    Mode of Communication:  Video Conference via [employer]well    As the provider I attest to compliance with applicable laws and regulations related to telemedicine.    DATA  Extended Session (53+ minutes): PROLONGED SERVICE IN THE OUTPATIENT SETTING REQUIRING DIRECT (FACE-TO-FACE) PATIENT CONTACT BEYOND THE USUAL SERVICE:    - Patient's presenting concerns require more intensive intervention than could be completed within the usual service  Interactive Complexity: No  Crisis: No     Progress Since Last Session (Related to Symptoms / Goals / Homework):   Symptoms: No change stable    Homework: Achieved / completed to satisfaction      Episode of Care Goals: Satisfactory progress - ACTION (Actively working towards change); Intervened by reinforcing change plan / affirming steps  "taken     Current / Ongoing Stressors and Concerns:   The reason for seeking services at this time is: \"Difficult family situation dealing with probable daughter's divorce\" \"stress and anxiety) The problem(s) began 06/20/21.   Healthy boundaries      Treatment Objective(s) Addressed in This Session:   identify 1-2 strategies to more effectively address stressors       Intervention:   ACT -  Therapist met with patient to review goals and interventions. Therapist utilized reflected listening as patient gave brief reflection of week. Patient processed adjusting to new normal to the holidays after her adult child's divorce. Therapist provided emotional support and validation. Updated treatment plan.     Assessments completed prior to visit:  The following assessments were completed by patient for this visit:  PHQ2:   PHQ-2 ( 1999 [employer]) 10/27/2022 8/3/2022 5/3/2022 1/13/2022 1/5/2022 10/4/2021 8/10/2020   Q1: Little interest or pleasure in doing things 0 1 1 1 1 0 1   Q2: Feeling down, depressed or hopeless 1 1 2 1 1 0 1   PHQ-2 Score 1 2 3 2 2 0 2   PHQ-2 Total Score (12-17 Years)- Positive if 3 or more points; Administer PHQ-A if positive - - - - - 0 2   Q1: Little interest or pleasure in doing things - Several days Several days Several days Several days - -   Q2: Feeling down, depressed or hopeless - Several days More than half the days Several days Several days - -   PHQ-2 Score - 2 3 2 2 - -     PHQ9:   PHQ-9 SCORE 8/10/2020 10/4/2021 1/5/2022 2/4/2022 3/4/2022 5/3/2022 10/27/2022   PHQ-9 Total Score [facility] - - - - - 7 (Mild depression) -   PHQ-9 Total Score 2 2 7 4 4 7 3     GAD2:   DAVID-2 12/30/2021 1/11/2022 4/12/2022 7/14/2022 10/31/2022   Feeling nervous, anxious, or on edge 3 2 1 1 1   Not being able to stop or control worrying 3 2 1 1 1   DAVID-2 Total Score 6 4 2 2 2     GAD7:   DAVID-7 SCORE 8/8/2019 8/10/2020 10/4/2021 12/30/2021 2/4/2022 3/4/2022 10/27/2022   Total Score - - - 12 (moderate anxiety) - - - "   Total Score 0 2 0 12 8 9 2     CAGE-AID:   CAGE-AID Total Score 12/30/2021   Total Score 0   Total Score MyChart 0 (A total score of 2 or greater is considered clinically significant)     PROMIS 10-Global Health (all questions and answers displayed):   PROMIS 10 12/30/2021 1/11/2022 4/12/2022 7/14/2022 10/31/2022   In general, would you say your health is: Very good Very good Good Good Very good   In general, would you say your quality of life is: Excellent Very good Very good Very good Very good   In general, how would you rate your physical health? Very good Very good Very good Fair Very good   In general, how would you rate your mental health, including your mood and your ability to think? Fair Good Good Good Good   In general, how would you rate your satisfaction with your social activities and relationships? Good Good Good Good Good   In general, please rate how well you carry out your usual social activities and roles Very good Fair Good Very good Good   To what extent are you able to carry out your everyday physical activities such as walking, climbing stairs, carrying groceries, or moving a chair? Completely Completely Completely Completely Completely   How often have you been bothered by emotional problems such as feeling anxious, depressed or irritable? Often Often Sometimes Sometimes Sometimes   How would you rate your fatigue on average? Moderate Moderate Moderate Moderate Mild   How would you rate your pain on average?   0 = No Pain  to  10 = Worst Imaginable Pain 0 0 0 3 0   In general, would you say your health is: 4 4 3 3 4   In general, would you say your quality of life is: 5 4 4 4 4   In general, how would you rate your physical health? 4 4 4 2 4   In general, how would you rate your mental health, including your mood and your ability to think? 2 3 3 3 3   In general, how would you rate your satisfaction with your social activities and relationships? 3 3 3 3 3   In general, please rate how well  you carry out your usual social activities and roles. (This includes activities at home, at work and in your community, and responsibilities as a parent, child, spouse, employee, friend, etc.) 4 2 3 4 3   To what extent are you able to carry out your everyday physical activities such as walking, climbing stairs, carrying groceries, or moving a chair? 5 5 5 5 5   In the past 7 days, how often have you been bothered by emotional problems such as feeling anxious, depressed, or irritable? 4 4 3 3 3   In the past 7 days, how would you rate your fatigue on average? 3 3 3 3 2   In the past 7 days, how would you rate your pain on average, where 0 means no pain, and 10 means worst imaginable pain? 0 0 0 3 0   Global Mental Health Score 12 12 13 13 13   Global Physical Health Score 17 17 17 14 18   PROMIS TOTAL - SUBSCORES 29 29 30 27 31   Some recent data might be hidden     PROMIS 10-Global Health (only subscores and total score):   PROMIS-10 Scores Only 12/30/2021 1/11/2022 4/12/2022 7/14/2022 10/31/2022   Global Mental Health Score 12 12 13 13 13   Global Physical Health Score 17 17 17 14 18   PROMIS TOTAL - SUBSCORES 29 29 30 27 31     [place] Suicide Severity Rating Scale (Lifetime/Recent)  [place] Suicide Severity Rating (Lifetime/Recent) 1/5/2022   Wish to be Dead (Lifetime) No   Non-Specific Active Suicidal Thoughts (Lifetime) No   Has subject engaged in non-suicidal self-injurious behavior? (Lifetime) No         ASSESSMENT: Current Emotional / Mental Status (status of significant symptoms):   Risk status (Self / Other harm or suicidal ideation)   Patient denies current fears or concerns for personal safety.   Patient denies current or recent suicidal ideation or behaviors.   Patient denies current or recent homicidal ideation or behaviors.   Patient denies current or recent self injurious behavior or ideation.   Patient denies other safety concerns.   Patient reports there has been no change in risk factors since  their last session.     Patient reports there has been no change in protective factors since their last session.     Recommended that patient call 911 or go to the local ED should there be a change in any of these risk factors.     Appearance:   Appropriate    Eye Contact:   Good    Psychomotor Behavior: Normal    Attitude:   Cooperative    Orientation:   All   Speech    Rate / Production: Normal     Volume:  Normal    Mood:    Normal   Affect:    Appropriate    Thought Content:  Clear    Thought Form:  Circumstantial   Insight:    Good  and Intellectual Insight     Medication Review:   No current psychiatric medications prescribed     Medication Compliance:   NA     Changes in Health Issues:   None reported     Chemical Use Review:   Substance Use: Chemical use reviewed, no active concerns identified      Tobacco Use: No current tobacco use.      Diagnosis:  1. Adjustment disorder with mixed anxiety and depressed mood        Collateral Reports Completed:   Not Applicable    PLAN: (Patient Tasks / Therapist Tasks / Other)  Patient will focus on and put energy into what is in her control.     Chante Goncalves                                            This note has been reviewed and I agree with the plan of care. This note is co-signed by Carlota Bush [facility] Supervisor, on: December 12, 2022   ______________________________________________________________________    Individual Treatment Plan    Patient's Name: Mima Barrett  YOB: 1945    Date of Creation: 1/13/2022  Date Treatment Plan Last Reviewed/Revised: 12/08/2022    DSM5 Diagnoses: Adjustment Disorders  309.28 (F43.23) With mixed anxiety and depressed mood  Psychosocial / Contextual Factors: family friction   PROMIS (reviewed every 90 days): 10/31/2022 score 31     Referral / Collaboration:  Referral to another professional/service is not indicated at this time..    Anticipated number of session for this episode of care:  20  Anticipation frequency of session: Every 3 months  Anticipated Duration of each session: 38-52 minutes  Treatment plan will be reviewed in 90 days or when goals have been changed.     MeasurableTreatment Goal(s) related to diagnosis / functional impairment(s)  Goal 1: Patient will gain skills and education to manage depression in a healthy manner, as measured by PHQ-9.    I will know I've met my goal when I feel rested in the morning, not fatigued, and receive 7-8 hours of sleep,       Objective #A (Patient Action)    Patient will identify 3-5 stressors which contribute to feelings of depression.  Status: Completed - Date: 4/13/2022     Intervention(s)  Therapist will assign homework identifying feelings of depression.    Objective #B  Patient will identify 3-5 strategies to more effectively address stressors.  Status: Completed - Date: 4/13/2022     Intervention(s)  Therapist will teach healthy coping skills.    Objective #C  Patient will participate in 3-5 activities to improve mood.  Status: Completed - Date: 12/08/2022    Intervention(s)  Therapist will assign homework involving activities to improve mood.      Goal 2: Patient will learn skills to manage my depression in healthy ways.    I will know I've met my goal when I complete my to do list daily.     Objective #A (Patient Action)    Status: Continued - Date(s): 12/12/2022     Patient will Identify negative self-talk and behaviors: challenge core beliefs, myths, and actions.    Intervention(s)  Therapist will teach positive self talk.    Objective #B  Patient will identify 3-5 stressors which contribute to feelings of anxiety.    Status: Completed - Date: 4/13/2022      Intervention(s)  Therapist will assign homework identifying anxious thoughts/ fears .    Objective #C  Patient will use at least 3-5 coping skills for anxiety management in the next 5 weeks.  Status: Completed - Date: 4/13/2022     Intervention(s)  Therapist will teach emotional  regulation skills. Anxiety coping mechanisms .        Chante Goncalves    December 08, 2022    This note has been reviewed and I agree with the plan of care. This note is co-signed by Carlota Bush [facility], on: December 12, 2022

## 2023-01-19 ENCOUNTER — VIRTUAL VISIT (OUTPATIENT)
Dept: PSYCHOLOGY | Facility: CLINIC | Age: 78
End: 2023-01-19
Payer: MEDICARE

## 2023-01-19 DIAGNOSIS — F43.23 ADJUSTMENT DISORDER WITH MIXED ANXIETY AND DEPRESSED MOOD: Primary | ICD-10-CM

## 2023-01-19 PROCEDURE — 90837 PSYTX W PT 60 MINUTES: CPT | Mod: 95
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
## 2023-02-09 ENCOUNTER — VIRTUAL VISIT (OUTPATIENT)
Dept: PSYCHOLOGY | Facility: CLINIC | Age: 78
End: 2023-02-09
Payer: MEDICARE

## 2023-02-09 DIAGNOSIS — F43.23 ADJUSTMENT DISORDER WITH MIXED ANXIETY AND DEPRESSED MOOD: Primary | ICD-10-CM

## 2023-02-09 PROCEDURE — 90837 PSYTX W PT 60 MINUTES: CPT | Mod: VID
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
## 2023-03-09 ENCOUNTER — VIRTUAL VISIT (OUTPATIENT)
Dept: PSYCHOLOGY | Facility: CLINIC | Age: 78
End: 2023-03-09
Payer: MEDICARE

## 2023-03-09 DIAGNOSIS — F43.23 ADJUSTMENT DISORDER WITH MIXED ANXIETY AND DEPRESSED MOOD: Primary | ICD-10-CM

## 2023-03-09 PROCEDURE — 90837 PSYTX W PT 60 MINUTES: CPT | Mod: VID

## 2023-03-09 NOTE — PROGRESS NOTES
[facility]                                      Progress Note     Patient Name: Mima Barrett  Date: 3/9/2022         Service Type: Individual      Session Start Time: 10:02 AM  Session End Time: 10:59     Session Length: 57     Session #: 21    Attendees: Client attended alone    Service Modality:  Video Visit:      Provider verified identity through the following two step process.  Patient provided:  Patient  and Patient is known previously to provider    Telemedicine Visit: The patient's condition can be safely assessed and treated via synchronous audio and visual telemedicine encounter.      Reason for Telemedicine Visit: Patient has requested telehealth visit    Originating Site (Patient Location): Patient's home    Distant Site (Provider Location): Provider Remote Setting- Home Office    Consent:  The patient/guardian has verbally consented to: the potential risks and benefits of telemedicine (video visit) versus in person care; bill my insurance or make self-payment for services provided; and responsibility for payment of non-covered services.     Patient would like the video invitation sent by:  My Chart    Mode of Communication:  Video Conference via [employer]    As the provider I attest to compliance with applicable laws and regulations related to telemedicine.    DATA  Extended Session (53+ minutes): PROLONGED SERVICE IN THE OUTPATIENT SETTING REQUIRING DIRECT (FACE-TO-FACE) PATIENT CONTACT BEYOND THE USUAL SERVICE:    - Patient's presenting concerns require more intensive intervention than could be completed within the usual service  Interactive Complexity: No  Crisis: No     Progress Since Last Session (Related to Symptoms / Goals / Homework):   Symptoms: stable     Homework: Achieved / completed to satisfaction      Episode of Care Goals: Satisfactory progress - ACTION (Actively working towards change); Intervened by reinforcing change plan / affirming steps taken     Current  "/ Ongoing Stressors and Concerns:   The reason for seeking services at this time is: \"Difficult family situation dealing with probable daughter's divorce\" \"stress and anxiety) The problem(s) began 06/20/21.   Healthy boundaries      Treatment Objective(s) Addressed in This Session:   identify 1-2 strategies to more effectively address stressors       Intervention:   ACT -  Therapist met with patient to review goals and interventions. Therapist utilized reflective listening as patient gave brief reflection of week. Patient processed residential challenges and adjustments over the years.  Therapist acknowledged thoughts/feelings and offered reassurance.    Assessments completed prior to visit:  The following assessments were completed by patient for this visit:  PHQ2:   PHQ-2 ( 1999 [employer]) 2/8/2023 10/27/2022 8/3/2022 5/3/2022 1/13/2022 1/5/2022 10/4/2021   Q1: Little interest or pleasure in doing things 1 0 1 1 1 1 0   Q2: Feeling down, depressed or hopeless 1 1 1 2 1 1 0   PHQ-2 Score 2 1 2 3 2 2 0   PHQ-2 Total Score (12-17 Years)- Positive if 3 or more points; Administer PHQ-A if positive - - - - - - 0   Q1: Little interest or pleasure in doing things Several days - Several days Several days Several days Several days -   Q2: Feeling down, depressed or hopeless Several days - Several days More than half the days Several days Several days -   PHQ-2 Score 2 - 2 3 2 2 -     PHQ9:   PHQ-9 SCORE 8/10/2020 10/4/2021 1/5/2022 2/4/2022 3/4/2022 5/3/2022 10/27/2022   PHQ-9 Total Score MyChart - - - - - 7 (Mild depression) -   PHQ-9 Total Score 2 2 7 4 4 7 3     GAD2:   DAVID-2 12/30/2021 1/11/2022 4/12/2022 7/14/2022 10/31/2022 2/8/2023   Feeling nervous, anxious, or on edge 3 2 1 1 1 1   Not being able to stop or control worrying 3 2 1 1 1 1   DAVID-2 Total Score 6 4 2 2 2 2     GAD7:   DAVID-7 SCORE 8/8/2019 8/10/2020 10/4/2021 12/30/2021 2/4/2022 3/4/2022 10/27/2022   Total Score - - - 12 (moderate anxiety) - - -   Total Score " 0 2 0 12 8 9 2     CAGE-AID:   CAGE-AID Total Score 12/30/2021   Total Score 0   Total Score MyChart 0 (A total score of 2 or greater is considered clinically significant)     PROMIS 10-Global Health (all questions and answers displayed):   PROMIS 10 12/30/2021 1/11/2022 4/12/2022 7/14/2022 10/31/2022 2/8/2023   In general, would you say your health is: Very good Very good Good Good Very good Very good   In general, would you say your quality of life is: Excellent Very good Very good Very good Very good Very good   In general, how would you rate your physical health? Very good Very good Very good Fair Very good Good   In general, how would you rate your mental health, including your mood and your ability to think? Fair Good Good Good Good Fair   In general, how would you rate your satisfaction with your social activities and relationships? Good Good Good Good Good Good   In general, please rate how well you carry out your usual social activities and roles Very good Fair Good Very good Good Very good   To what extent are you able to carry out your everyday physical activities such as walking, climbing stairs, carrying groceries, or moving a chair? Completely Completely Completely Completely Completely Completely   How often have you been bothered by emotional problems such as feeling anxious, depressed or irritable? Often Often Sometimes Sometimes Sometimes Sometimes   How would you rate your fatigue on average? Moderate Moderate Moderate Moderate Mild Moderate   How would you rate your pain on average?   0 = No Pain  to  10 = Worst Imaginable Pain 0 0 0 3 0 0   In general, would you say your health is: 4 4 3 3 4 4   In general, would you say your quality of life is: 5 4 4 4 4 4   In general, how would you rate your physical health? 4 4 4 2 4 3   In general, how would you rate your mental health, including your mood and your ability to think? 2 3 3 3 3 2   In general, how would you rate your satisfaction with your  social activities and relationships? 3 3 3 3 3 3   In general, please rate how well you carry out your usual social activities and roles. (This includes activities at home, at work and in your community, and responsibilities as a parent, child, spouse, employee, friend, etc.) 4 2 3 4 3 4   To what extent are you able to carry out your everyday physical activities such as walking, climbing stairs, carrying groceries, or moving a chair? 5 5 5 5 5 5   In the past 7 days, how often have you been bothered by emotional problems such as feeling anxious, depressed, or irritable? 4 4 3 3 3 3   In the past 7 days, how would you rate your fatigue on average? 3 3 3 3 2 3   In the past 7 days, how would you rate your pain on average, where 0 means no pain, and 10 means worst imaginable pain? 0 0 0 3 0 0   Global Mental Health Score 12 12 13 13 13 12   Global Physical Health Score 17 17 17 14 18 16   PROMIS TOTAL - SUBSCORES 29 29 30 27 31 28   Some recent data might be hidden     PROMIS 10-Global Health (only subscores and total score):   PROMIS-10 Scores Only 12/30/2021 1/11/2022 4/12/2022 7/14/2022 10/31/2022 2/8/2023   Global Mental Health Score 12 12 13 13 13 12   Global Physical Health Score 17 17 17 14 18 16   PROMIS TOTAL - SUBSCORES 29 29 30 27 31 28     [place] Suicide Severity Rating Scale (Lifetime/Recent)  [place] Suicide Severity Rating (Lifetime/Recent) 1/5/2022   Wish to be Dead (Lifetime) No   Non-Specific Active Suicidal Thoughts (Lifetime) No   Has subject engaged in non-suicidal self-injurious behavior? (Lifetime) No         ASSESSMENT: Current Emotional / Mental Status (status of significant symptoms):   Risk status (Self / Other harm or suicidal ideation)   Patient denies current fears or concerns for personal safety.   Patient denies current or recent suicidal ideation or behaviors.   Patient denies current or recent homicidal ideation or behaviors.   Patient denies current or recent self injurious  behavior or ideation.   Patient denies other safety concerns.   Patient reports there has been no change in risk factors since their last session.     Patient reports there has been no change in protective factors since their last session.     Recommended that patient call 911 or go to the local ED should there be a change in any of these risk factors.     Appearance:   Appropriate    Eye Contact:   Good    Psychomotor Behavior: Normal    Attitude:   Cooperative    Orientation:   All   Speech    Rate / Production: Normal     Volume:  Normal    Mood:    Normal   Affect:    Worrisome    Thought Content:  Rumination    Thought Form:  Circumstantial   Insight:    Good  and Intellectual Insight     Medication Review:   No current psychiatric medications prescribed     Medication Compliance:   NA     Changes in Health Issues:   None reported     Chemical Use Review:   Substance Use: Chemical use reviewed, no active concerns identified      Tobacco Use: No current tobacco use.      Diagnosis:  1. Adjustment disorder with mixed anxiety and depressed mood        Collateral Reports Completed:   Not Applicable    PLAN: (Patient Tasks / Therapist Tasks / Other)  Patient will continue working towards goals.    Chante Goncalves, LICSW                                              ______________________________________________________________________    Individual Treatment Plan    Patient's Name: Mima Barrett  YOB: 1945    Date of Creation: 1/13/2022  Date Treatment Plan Last Reviewed/Revised: 3/09/2023    DSM5 Diagnoses: Adjustment Disorders  309.28 (F43.23) With mixed anxiety and depressed mood  Psychosocial / Contextual Factors: family friction   PROMIS (reviewed every 90 days): 10/31/2022 score 31     Referral / Collaboration:  Referral to another professional/service is not indicated at this time..    Anticipated number of session for this episode of care: 20  Anticipation frequency of session: Every 3  months  Anticipated Duration of each session: 38-52 minutes  Treatment plan will be reviewed in 90 days or when goals have been changed.     MeasurableTreatment Goal(s) related to diagnosis / functional impairment(s)  Goal 1: Patient will gain skills and education to manage depression in a healthy manner, as measured by PHQ-9.    I will know I've met my goal when I feel rested in the morning, not fatigued, and receive 7-8 hours of sleep,       Objective #A (Patient Action)    Patient will identify 3-5 stressors which contribute to feelings of depression.  Status: Completed - Date: 4/13/2022     Intervention(s)  Therapist will assign homework identifying feelings of depression.    Objective #B  Patient will identify 3-5 strategies to more effectively address stressors.  Status: Completed - Date: 4/13/2022     Intervention(s)  Therapist will teach healthy coping skills.    Objective #C  Patient will participate in 3-5 activities to improve mood.  Status: Completed - Date: 12/08/2022    Intervention(s)  Therapist will assign homework involving activities to improve mood.      Goal 2: Patient will learn skills to manage my depression in healthy ways.    I will know I've met my goal when I complete my to do list daily.     Objective #A (Patient Action)    Status: Continued - Date(s): 3/09/2023    Patient will Identify negative self-talk and behaviors: challenge core beliefs, myths, and actions.    Intervention(s)  Therapist will teach positive self talk.    Objective #B  Patient will identify 3-5 stressors which contribute to feelings of anxiety.    Status: Completed - Date: 4/13/2022      Intervention(s)  Therapist will assign homework identifying anxious thoughts/ fears .    Objective #C  Patient will use at least 3-5 coping skills for anxiety management in the next 5 weeks.  Status: Completed - Date: 4/13/2022     Intervention(s)  Therapist will teach emotional regulation skills. Anxiety coping mechanisms  .        Chante Goncalves, [facility]    March 09, 2022

## 2023-04-12 ENCOUNTER — VIRTUAL VISIT (OUTPATIENT)
Dept: PSYCHOLOGY | Facility: CLINIC | Age: 78
End: 2023-04-12
Payer: MEDICARE

## 2023-04-12 DIAGNOSIS — F43.23 ADJUSTMENT DISORDER WITH MIXED ANXIETY AND DEPRESSED MOOD: Primary | ICD-10-CM

## 2023-04-12 PROCEDURE — 90837 PSYTX W PT 60 MINUTES: CPT | Mod: VID

## 2023-04-12 NOTE — PROGRESS NOTES
Discharge Summary  Multiple Sessions    Client Name: Mima Barrett MRN#: [id number] YOB: 1945    Discharge Date:   2023    Service Modality: Video Visit:      Provider verified identity through the following two step process.  Patient provided:  Patient  and Patient is known previously to provider    Telemedicine Visit: The patient's condition can be safely assessed and treated via synchronous audio and visual telemedicine encounter.      Reason for Telemedicine Visit: Patient has requested telehealth visit    Originating Site (Patient Location): Patient's home    Distant Site (Provider Location): Provider Remote Setting- Home Office    Consent:  The patient/guardian has verbally consented to: the potential risks and benefits of telemedicine (video visit) versus in person care; bill my insurance or make self-payment for services provided; and responsibility for payment of non-covered services.     Patient would like the video invitation sent by:  My Chart    Mode of Communication:  Video Conference via [employer]    Distant Location (Provider):  Off-site    As the provider I attest to compliance with applicable laws and regulations related to telemedicine.    Service Type: Individual      Session Start Time: 10:00  Session End Time: 10:55      Session Length: 55     Session #: 22     Attendees: Client      Focus of Treatment Objective(s):  Client's presenting concerns included: Adjustment Difficulties related to: loss of signigicant relationship  Stage of Change at time of Discharge: MAINTENANCE (Working to maintain change, with risk of relapse)    Medication Adherence:  NA    Chemical Use:  NA    Assessment: Current Emotional / Mental Status (status of significant symptoms):    Risk status (Self / Other harm or suicidal ideation)  Client denies current fears or concerns for personal safety.  Client denies current or recent suicidal ideation or behaviors.  Client denies  current or recent homicidal ideation or behaviors.  Client denies current or recent self injurious behavior or ideation.  Client denies other safety concerns.  A safety and risk management plan has not been developed at this time, however client was given the after-hours number should there be a change in any of these risk factors.    Appearance:   Appropriate   Eye Contact:   Good   Psychomotor Behavior: Normal   Attitude:   Cooperative   Orientation:   All  Speech   Rate / Production: Normal    Volume:  Normal   Mood:    Normal  Affect:    Appropriate   Thought Content:  Clear   Thought Form:  Coherent  Logical   Insight:   Good  and Intellectual Insight    DSM5 Diagnoses: (Sustained by DSM5 Criteria Listed Above)  Diagnoses: Adjustment Disorders  309.28 (F43.23) With mixed anxiety and depressed mood  Psychosocial & Contextual Factors: family friction  WHODAS 2.0 (12 item) Score: N/a    Reason for Discharge:  Client is satisfied with progress      Aftercare Plan:  Client may resume counseling services at any time in the future by calling the [facility] Intake Office, [phone].      Chante Goncalves, [facility]  April 12, 2023

## 2023-10-27 NOTE — PROGRESS NOTES
SUBJECTIVE:                                                   Mima Barrett is a 78 year old female who presents to clinic today for the following health issue(s):  Patient presents with:  Physical: Annual exam; discuss fosamax prescription        HPI:  Having some urinary leakage that is bothering her. Has not done anything yet.    No vb/discharge.     No LMP recorded. Patient is postmenopausal..     Patient is sexually active, .  Using menopause for contraception.    reports that she has never smoked. She has never used smokeless tobacco.  STD testing offered?  Declined    Health maintenance reviewed:  yes    Overdue          Never done HEPATITIS C SCREENING (Once)     Never done RSV VACCINE 60+ (1 - 1-dose 60+ series)     2023 DTAP/TDAP/TD IMMUNIZATION (2 - Td or Tdap)  Last completed: 2013     OCT 27   2023 FALL RISK ASSESSMENT (Yearly)  Last completed: Oct 27, 2022        Due Soon          2023 MEDICARE ANNUAL WELLNESS VISIT (Yearly)   Last completed: 2022        Upcoming          OCT 27   2027 LIPID (Every 5 Years)  Last completed: Oct 27, 2022     OCT 30   2027 ADVANCE CARE PLANNING (Every 5 Years)  Last completed: Oct 30, 2022     OCT 27   2037 DEXA (Every 15 Years)  Last completed: Oct 27, 2022       Today's PHQ-2 Score:       2023     8:53 AM   PHQ-2 (  [employer])   Q1: Little interest or pleasure in doing things 0   Q2: Feeling down, depressed or hopeless 0   PHQ-2 Score 0     Today's PHQ-9 Score:       2023     8:53 AM   PHQ-9 SCORE   PHQ-9 Total Score 2     Today's DAVID-7 Score:       2023     8:53 AM   DAVID-7 SCORE   Total Score 1       Problem list and histories reviewed & adjusted, as indicated.  Additional history: as documented.    Patient Active Problem List   Diagnosis    Hyperlipidemia LDL goal <130    Pap smear of cervix not needed     Past Surgical History:   Procedure Laterality Date    CATARACT EXTRACTION W/ INTRAOCULAR LENS   "IMPLANT, BILATERAL Bilateral 05/2023    cryotherapy  01/01/1978    No AIDAN 2/3 since    ENDOMETRIAL SAMPLING (BIOPSY)  4/97;11/98;4/00 '97-Prolif endo;'98-prolif endo;4/00-prolif endo    GALLBLADDER SURGERY  07/2022    OTHER SURGICAL HISTORY  01/01/1977    REMOVAL NODULES ON VOCAL CORDS      Social History     Tobacco Use    Smoking status: Never    Smokeless tobacco: Never   Substance Use Topics    Alcohol use: Yes     Alcohol/week: 0.0 standard drinks of alcohol     Comment: 3 times/wk      Problem (# of Occurrences) Relation (Name,Age of Onset)    Osteoporosis (2) Mother, Maternal Grandmother    Colon Polyps (1) Father    Fractures (1) Mother (83): hip; had previously had radiation therapy for uterine cancer    Uterine Cancer (1) Mother              Current Outpatient Medications   Medication Sig    aspirin (ASA) 81 MG chewable tablet Take 81 mg by mouth daily    calcium-vitamin D (CALTRATE) 600-400 MG-UNIT per tablet Take 1 tablet by mouth daily. Takes 2 tablets in am     Multiple Vitamins-Minerals (MULTIVITAMIN & MINERAL PO) Take 1 tablet by mouth daily.     No current facility-administered medications for this visit.     No Known Allergies    ROS:  12 point review of systems negative other than symptoms noted below or in the HPI.        OBJECTIVE:     /58 (BP Location: Right arm)   Ht 1.638 m (5' 4.5\")   Wt 68.9 kg (152 lb)   BMI 25.69 kg/m    Body mass index is 25.69 kg/m .    Exam:  Constitutional:  Appearance: Well nourished, well developed alert, in no acute distress  Breasts:  Inspection of Breasts:  Symmetric bilaterally.  No puckering.  No skin changes.  Palpation of Breasts and Axillae:  No masses present on palpation, no breast tenderness Axillary Lymph Nodes:  No lymphadenopathy present  Psychiatric:  Mentation appears normal and affect normal/bright.  Pelvic Exam:  External Genitalia:     Normal appearance for age, no discharge present, no tenderness present, no inflammatory lesions " present, color normal  Vagina:     Normal vaginal vault without central or paravaginal defects, no discharge present, no inflammatory lesions present, no masses present  Bladder:     Nontender to palpation  Urethra:   Urethral Body:  Urethra palpation normal, urethra structural support normal   Urethral Meatus:  No erythema or lesions present  Cervix:     Appearance healthy, no lesions present, nontender to palpation, no bleeding present  Uterus:     Uterus: firm, normal sized and nontender, midplane in position.   Adnexa:     No adnexal tenderness present, no adnexal masses present  Perineum:     Perineum within normal limits, no evidence of trauma, no rashes or skin lesions present  Anus:     Anus within normal limits, no hemorrhoids present  Inguinal Lymph Nodes:     No lymphadenopathy present  Pubic Hair:     Normal pubic hair distribution for age  Genitalia and Groin:     No rashes present, no lesions present, no areas of discoloration, no masses present       ASSESSMENT/PLAN:                                                        ICD-10-CM    1. Encounter for gynecological examination without abnormal finding  Z01.419               -No further need for cervical cancer screening.   -Breast self awareness discussed. UTD for mammogram.  -Osteoporosis prevention discussed. Due for dexa next year.  Has completed 5yrs of fosamax  -PCP to manage labs. Encouraged follow-up, planned in March  -PMB precautions  -Handouts on kegels. Discussed incontinence tampons.   -Return one year for next annual exam    (25 minutes was spent on the date of the encounter doing chart review, review and interpretation of pertinent test results, history and/or exam, documentation, patient counseling.)          Kirstin Bower Masters, DO  [facility] [place]

## 2023-11-02 ENCOUNTER — ANCILLARY PROCEDURE (OUTPATIENT)
Dept: MAMMOGRAPHY | Facility: CLINIC | Age: 78
End: 2023-11-02
Payer: COMMERCIAL

## 2023-11-02 ENCOUNTER — OFFICE VISIT (OUTPATIENT)
Dept: OBGYN | Facility: CLINIC | Age: 78
End: 2023-11-02
Payer: COMMERCIAL

## 2023-11-02 VITALS
SYSTOLIC BLOOD PRESSURE: 120 MMHG | DIASTOLIC BLOOD PRESSURE: 58 MMHG | HEIGHT: 65 IN | BODY MASS INDEX: 25.33 KG/M2 | WEIGHT: 152 LBS

## 2023-11-02 DIAGNOSIS — Z01.419 ENCOUNTER FOR GYNECOLOGICAL EXAMINATION WITHOUT ABNORMAL FINDING: Primary | ICD-10-CM

## 2023-11-02 DIAGNOSIS — Z12.31 VISIT FOR SCREENING MAMMOGRAM: ICD-10-CM

## 2023-11-02 PROCEDURE — 77063 BREAST TOMOSYNTHESIS BI: CPT | Mod: TC | Performed by: RADIOLOGY

## 2023-11-02 PROCEDURE — 77067 SCR MAMMO BI INCL CAD: CPT | Mod: TC | Performed by: RADIOLOGY

## 2023-11-02 PROCEDURE — 99397 PER PM REEVAL EST PAT 65+ YR: CPT | Performed by: OBSTETRICS & GYNECOLOGY

## 2023-11-02 ASSESSMENT — ANXIETY QUESTIONNAIRES
6. BECOMING EASILY ANNOYED OR IRRITABLE: NOT AT ALL
3. WORRYING TOO MUCH ABOUT DIFFERENT THINGS: SEVERAL DAYS
2. NOT BEING ABLE TO STOP OR CONTROL WORRYING: NOT AT ALL
5. BEING SO RESTLESS THAT IT IS HARD TO SIT STILL: NOT AT ALL
7. FEELING AFRAID AS IF SOMETHING AWFUL MIGHT HAPPEN: NOT AT ALL
IF YOU CHECKED OFF ANY PROBLEMS ON THIS QUESTIONNAIRE, HOW DIFFICULT HAVE THESE PROBLEMS MADE IT FOR YOU TO DO YOUR WORK, TAKE CARE OF THINGS AT HOME, OR GET ALONG WITH OTHER PEOPLE: NOT DIFFICULT AT ALL
GAD7 TOTAL SCORE: 1
GAD7 TOTAL SCORE: 1
1. FEELING NERVOUS, ANXIOUS, OR ON EDGE: NOT AT ALL

## 2023-11-02 ASSESSMENT — PATIENT HEALTH QUESTIONNAIRE - PHQ9
5. POOR APPETITE OR OVEREATING: NOT AT ALL
SUM OF ALL RESPONSES TO PHQ QUESTIONS 1-9: 2

## 2023-11-02 NOTE — PATIENT INSTRUCTIONS
-Poise Impressa incontinence tampons, or similar brand for urinary leaking        Patient Education   Personalized Prevention Plan  You are due for the preventive services outlined below.  Your care team is available to assist you in scheduling these services.  If you have already completed any of these items, please share that information with your care team to update in your medical record.  Health Maintenance Due   Topic Date Due    Hepatitis C Screening  Never done    RSV VACCINE 60+ (1 - 1-dose 60+ series) Never done    Diptheria Tetanus Pertussis (DTAP/TDAP/TD) Vaccine (2 - Td or Tdap) 04/23/2023    FALL RISK ASSESSMENT  10/27/2023    Annual Wellness Visit  11/17/2023          -Daily total calcium intake (between food/supplements) should be 1200mg which equates to 5 servings calcium containing food per day; VItamin D 2000IU.   Foods rich in calcium are: milk, cheese, yogurt, seafood, sardines and canned salmon, leafy green vegetables such as melida greens, spinach and kale, beans and lentils, almonds, seeds (poppy, sesame, celery, heide), rhubarb, dried fruit such as figs, whey protein, tofu and edamame, amaranth, other foods with added calcium such as orange juice and some cereals.   If adequate amount not taken in diet, then a supplement may be needed.     -I also recommend increasing your dietary fiber by starting Metamucil (powder mixed in glass of water) once to twice daily

## 2024-03-12 ENCOUNTER — TELEPHONE (OUTPATIENT)
Dept: OBGYN | Facility: CLINIC | Age: 79
End: 2024-03-12
Payer: COMMERCIAL

## 2024-03-12 NOTE — TELEPHONE ENCOUNTER
[facility]    Phone Message    May a detailed message be left on voicemail: yes     Reason for Call: Symptoms or Concerns     If patient has red-flag symptoms, warm transfer to triage line    Current symptom or concern: Pt having breast pain, writer unable to reach secure chat     Symptoms have been present for:  2 day(s)    Has patient previously been seen for this? No    Are there any new or worsening symptoms? No    Action Taken: Message routed to:  Other: [facility]    Travel Screening: Not Applicable

## 2024-03-12 NOTE — PROGRESS NOTES
SUBJECTIVE:                                                   Mima Barrett is a 78 year old female who presents to clinic today for the following health issue(s):  Patient presents with:  Breast Problem: Right sided breast pain/tenderness that radiates to armpit. Symptoms started a few weeks ago and have worsened in the past few days. Also had an episode of extreme pain in right nipple that lasted a few minutes and went away. Doing self breast exams and has not palpated any lumps. Denies any nipple discharge or itching.     HPI:  Soreness in right breast and under arm into arm pit for week or two. Just the other night had sharp sudden pain and felt pain into nipple.   No recent illnesses. No lump/discharge/blood/visual changes.  Sleeping on right side makes the breast/chest area hurt too.   Last mammogram  birads 1. Has had similar yearly testing before this too    No history trauma, no hx shoulder injury. No new exercises. No recent lifting/pushing/pulling.       No LMP recorded. Patient is postmenopausal.    Patient is sexually active, .  Using menopause for contraception.    reports that she has never smoked. She has never used smokeless tobacco.    STD testing offered?  Declined    Health maintenance updated:  yes    Today's PHQ-2 Score:       3/13/2024    10:52 AM   PHQ-2 (  [employer])   Q1: Little interest or pleasure in doing things 0   Q2: Feeling down, depressed or hopeless 0   PHQ-2 Score 0     Today's PHQ-9 Score:       3/13/2024    10:52 AM   PHQ-9 SCORE   PHQ-9 Total Score 2     Today's DAVID-7 Score:       3/13/2024    10:52 AM   DAVID-7 SCORE   Total Score 0       Problem list and histories reviewed & adjusted, as indicated.  Additional history: as documented.    Patient Active Problem List   Diagnosis    Hyperlipidemia LDL goal <130    Pap smear of cervix not needed     Past Surgical History:   Procedure Laterality Date    CATARACT EXTRACTION W/ INTRAOCULAR LENS  IMPLANT, BILATERAL  "Bilateral 05/2023    cryotherapy  01/01/1978    No AIDAN 2/3 since    ENDOMETRIAL SAMPLING (BIOPSY)  4/97;11/98;4/00 '97-Prolif endo;'98-prolif endo;4/00-prolif endo    GALLBLADDER SURGERY  07/2022    OTHER SURGICAL HISTORY  01/01/1977    REMOVAL NODULES ON VOCAL CORDS      Social History     Tobacco Use    Smoking status: Never    Smokeless tobacco: Never   Substance Use Topics    Alcohol use: Yes     Alcohol/week: 0.0 standard drinks of alcohol     Comment: 3 times/wk      Problem (# of Occurrences) Relation (Name,Age of Onset)    Osteoporosis (2) Mother, Maternal Grandmother    Colon Polyps (1) Father    Fractures (1) Mother (83): hip; had previously had radiation therapy for uterine cancer    Uterine Cancer (1) Mother              Current Outpatient Medications   Medication Sig    aspirin (ASA) 81 MG chewable tablet Take 81 mg by mouth daily    calcium-vitamin D (CALTRATE) 600-400 MG-UNIT per tablet Take 1 tablet by mouth daily. Takes 2 tablets in am     Multiple Vitamins-Minerals (MULTIVITAMIN & MINERAL PO) Take 1 tablet by mouth daily.     No current facility-administered medications for this visit.     No Known Allergies          OBJECTIVE:     /78   Ht 1.638 m (5' 4.5\")   Wt 70.8 kg (156 lb)   BMI 26.36 kg/m    Body mass index is 26.36 kg/m .    Exam:  Constitutional:  Appearance: Well nourished, well developed alert, in no acute distress  Breasts:  Inspection of Breasts:  Symmetric bilaterally.  No puckering.  No skin changes.  Palpation of Breasts and Axillae:  No masses present on palpation, SLIGHT TENDERNESS JUST BELOW RIGHT NIPPLE Axillary Lymph Nodes:  No lymphadenopathy present  Lymphatic: Lymph Nodes:  No other lymphadenopathy present   PAIN INTO RIGHT AXILLA, OF PECTORALIS MUSCLES AS WELL AS POSTERIOR SHOULDER      ASSESSMENT/PLAN:                                                        ICD-10-CM    1. Breast pain, right  N64.4 MA Diagnostic Digital Right          --Will get diagnostic " imaging on right breast. Low suspicion for pathology. Suspect pain is musculoskeletal of unknown cause. Rec nsaids, ice, stretching. If persists may need to see PCP or consider physical therapy.   Patient happy with this plan.     Kirstin Bower Masters, [facility] FOR [facility]

## 2024-03-12 NOTE — TELEPHONE ENCOUNTER
Pt noting pain in her right breast. Over the top and down into her armpit.  Has been going on over the last few weeks, however, the pain has been much worse over the last few days.  Notes stabbing pain in her nipple over night.  Does not feel any lumps/bumps, any discharge, redness, or fevers.    Recommend appt to be seen for breast exam.  Pt verbalized understanding, in agreement with plan, and voiced no further questions.  Pt transferred to scheduling.  Christopher Nolan RN on 3/12/2024 at 3:49 PM

## 2024-03-13 ENCOUNTER — OFFICE VISIT (OUTPATIENT)
Dept: OBGYN | Facility: CLINIC | Age: 79
End: 2024-03-13
Payer: COMMERCIAL

## 2024-03-13 VITALS
BODY MASS INDEX: 25.99 KG/M2 | HEIGHT: 65 IN | SYSTOLIC BLOOD PRESSURE: 128 MMHG | DIASTOLIC BLOOD PRESSURE: 78 MMHG | WEIGHT: 156 LBS

## 2024-03-13 DIAGNOSIS — N64.4 BREAST PAIN, RIGHT: Primary | ICD-10-CM

## 2024-03-13 PROCEDURE — 99213 OFFICE O/P EST LOW 20 MIN: CPT | Performed by: OBSTETRICS & GYNECOLOGY

## 2024-03-13 ASSESSMENT — ANXIETY QUESTIONNAIRES
1. FEELING NERVOUS, ANXIOUS, OR ON EDGE: NOT AT ALL
6. BECOMING EASILY ANNOYED OR IRRITABLE: NOT AT ALL
5. BEING SO RESTLESS THAT IT IS HARD TO SIT STILL: NOT AT ALL
3. WORRYING TOO MUCH ABOUT DIFFERENT THINGS: NOT AT ALL
GAD7 TOTAL SCORE: 0
2. NOT BEING ABLE TO STOP OR CONTROL WORRYING: NOT AT ALL
GAD7 TOTAL SCORE: 0
7. FEELING AFRAID AS IF SOMETHING AWFUL MIGHT HAPPEN: NOT AT ALL

## 2024-03-13 ASSESSMENT — PATIENT HEALTH QUESTIONNAIRE - PHQ9
5. POOR APPETITE OR OVEREATING: NOT AT ALL
SUM OF ALL RESPONSES TO PHQ QUESTIONS 1-9: 2

## 2024-03-20 ENCOUNTER — HOSPITAL ENCOUNTER (OUTPATIENT)
Dept: MAMMOGRAPHY | Facility: CLINIC | Age: 79
Discharge: HOME | End: 2024-03-20
Attending: OBSTETRICS & GYNECOLOGY
Payer: COMMERCIAL

## 2024-03-20 DIAGNOSIS — N64.4 BREAST PAIN, RIGHT: ICD-10-CM

## 2024-03-20 PROCEDURE — 77061 BREAST TOMOSYNTHESIS UNI: CPT

## 2024-03-20 PROCEDURE — 76642 ULTRASOUND BREAST LIMITED: CPT | Mod: RT

## 2024-10-17 ENCOUNTER — TELEPHONE (OUTPATIENT)
Dept: OBGYN | Facility: CLINIC | Age: 79
End: 2024-10-17
Payer: COMMERCIAL

## 2024-10-17 NOTE — TELEPHONE ENCOUNTER
Pt updated of policy change with fasting labs, only available after annual appt.  Christopher Nolan RN on 10/17/2024 at 9:16 AM   [facility]

## 2024-10-17 NOTE — TELEPHONE ENCOUNTER
[facility] Call Center    Phone Message    May a detailed message be left on voicemail: yes     Reason for Call: Order(s): Fasting labs   Reason for requested: patient has appt with provider  Date needed: 12/05/24  Provider name: Kirstin Godoy    Action Taken: Other: Women[facility]    Travel Screening: Not Applicable     Date of Service:

## 2024-12-05 ENCOUNTER — OFFICE VISIT (OUTPATIENT)
Dept: OBGYN | Facility: CLINIC | Age: 79
End: 2024-12-05
Payer: COMMERCIAL

## 2024-12-05 VITALS
HEIGHT: 64 IN | WEIGHT: 154 LBS | BODY MASS INDEX: 26.29 KG/M2 | SYSTOLIC BLOOD PRESSURE: 132 MMHG | DIASTOLIC BLOOD PRESSURE: 78 MMHG

## 2024-12-05 DIAGNOSIS — R39.15 URINARY URGENCY: ICD-10-CM

## 2024-12-05 DIAGNOSIS — Z01.419 ENCOUNTER FOR BREAST AND PELVIC EXAMINATION: ICD-10-CM

## 2024-12-05 DIAGNOSIS — M25.551 PAIN OF RIGHT HIP: ICD-10-CM

## 2024-12-05 DIAGNOSIS — Z53.8 PAP SMEAR OF CERVIX NOT NEEDED: ICD-10-CM

## 2024-12-05 DIAGNOSIS — M85.80 OSTEOPENIA, SENILE: Primary | ICD-10-CM

## 2024-12-05 PROCEDURE — G0101 CA SCREEN;PELVIC/BREAST EXAM: HCPCS | Performed by: OBSTETRICS & GYNECOLOGY

## 2024-12-05 PROCEDURE — 99213 OFFICE O/P EST LOW 20 MIN: CPT | Mod: 25 | Performed by: OBSTETRICS & GYNECOLOGY

## 2024-12-05 PROCEDURE — 99459 PELVIC EXAMINATION: CPT | Performed by: OBSTETRICS & GYNECOLOGY

## 2024-12-05 ASSESSMENT — ANXIETY QUESTIONNAIRES
GAD7 TOTAL SCORE: 4
6. BECOMING EASILY ANNOYED OR IRRITABLE: NOT AT ALL
2. NOT BEING ABLE TO STOP OR CONTROL WORRYING: MORE THAN HALF THE DAYS
IF YOU CHECKED OFF ANY PROBLEMS ON THIS QUESTIONNAIRE, HOW DIFFICULT HAVE THESE PROBLEMS MADE IT FOR YOU TO DO YOUR WORK, TAKE CARE OF THINGS AT HOME, OR GET ALONG WITH OTHER PEOPLE: NOT DIFFICULT AT ALL
5. BEING SO RESTLESS THAT IT IS HARD TO SIT STILL: NOT AT ALL
1. FEELING NERVOUS, ANXIOUS, OR ON EDGE: NOT AT ALL
3. WORRYING TOO MUCH ABOUT DIFFERENT THINGS: MORE THAN HALF THE DAYS
7. FEELING AFRAID AS IF SOMETHING AWFUL MIGHT HAPPEN: NOT AT ALL
GAD7 TOTAL SCORE: 4

## 2024-12-05 ASSESSMENT — PATIENT HEALTH QUESTIONNAIRE - PHQ9
SUM OF ALL RESPONSES TO PHQ QUESTIONS 1-9: 4
5. POOR APPETITE OR OVEREATING: NOT AT ALL

## 2024-12-05 NOTE — PROGRESS NOTES
SUBJECTIVE:                                                   Mima Barrett is a 79 year old female who presents to clinic today for the following health issue(s):  breast and pelvic exam     HPI:  Has PCP she follows with regularly.     Has been having more pain in right hip when walking distances.   Has had shots in hip before.   Due for dexa. Was on Fosamax [id number].    Uses incontinence tampons from time to time.  Has urinary urgency if bladder gets full.   Not often has leaking  Wondering what else can be done.     Still having right sided pain on side of breast. No shoulder pain. Not usually causes pain on its own, only when touched. Perhaps from wearing wrong bra she thinks.    Going to [place] in . Worried about getting around.     No LMP recorded. Patient is postmenopausal..     Patient is sexually active, .  Using menopause for contraception.    reports that she has never smoked. She has never used smokeless tobacco.    STD testing offered?  Declined    Health maintenance updated:  yes  Care Gaps  Close care gaps  Overdue          Never done HEPATITIS C SCREENING (Once)     Never done RSV VACCINE (1 - 1-dose 75+ series)     2023 DTAP/TDAP/TD IMMUNIZATION (2 - Td or Tdap)  Last completed: 2013     OCT 27  2023 LIPID (Yearly)  Last completed: Oct 27, 2022              Due Soon          DEC 29  2024 MEDICARE ANNUAL WELLNESS VISIT (Yearly)   Last completed: Dec 29, 2023         Upcoming          2025 GLUCOSE (Every 3 Years)  Last completed: 2022     OCT 30  2027 ADVANCE CARE PLANNING (Every 5 Years)  Last completed: Oct 30, 2022     OCT 27  2037 DEXA (Every 15 Years)  Last completed: Oct 27, 2022     Today's PHQ-2 Score:       2024    10:16 AM   PHQ-2 (  [employer])   Q1: Little interest or pleasure in doing things 0   Q2: Feeling down, depressed or hopeless 0   PHQ-2 Score 0     Today's PHQ-9 Score:       2024    10:16 AM   PHQ-9 SCORE   PHQ-9 Total  "Score 4     Today's DAVID-7 Score:       12/5/2024    10:16 AM   DAVID-7 SCORE   Total Score 4       Problem list and histories reviewed & adjusted, as indicated.  Additional history: as documented.    Patient Active Problem List   Diagnosis    Hyperlipidemia LDL goal <130    Pap smear of cervix not needed     Past Surgical History:   Procedure Laterality Date    CATARACT EXTRACTION W/ INTRAOCULAR LENS  IMPLANT, BILATERAL Bilateral 05/2023    cryotherapy  01/01/1978    No AIDAN 2/3 since    ENDOMETRIAL SAMPLING (BIOPSY)  4/97;11/98;4/00 '97-Prolif endo;'98-prolif endo;4/00-prolif endo    GALLBLADDER SURGERY  07/2022    OTHER SURGICAL HISTORY  01/01/1977    REMOVAL NODULES ON VOCAL CORDS      Social History     Tobacco Use    Smoking status: Never    Smokeless tobacco: Never   Substance Use Topics    Alcohol use: Yes     Alcohol/week: 0.0 standard drinks of alcohol     Comment: 3 times/wk      Problem (# of Occurrences) Relation (Name,Age of Onset)    Osteoporosis (2) Mother, Maternal Grandmother    Colon Polyps (1) Father    Fractures (1) Mother (83): hip; had previously had radiation therapy for uterine cancer    Uterine Cancer (1) Mother              Current Outpatient Medications   Medication Sig Dispense Refill    aspirin (ASA) 81 MG chewable tablet Take 81 mg by mouth daily      calcium-vitamin D (CALTRATE) 600-400 MG-UNIT per tablet Take 1 tablet by mouth daily. Takes 2 tablets in am       Multiple Vitamins-Minerals (MULTIVITAMIN & MINERAL PO) Take 1 tablet by mouth daily.       No current facility-administered medications for this visit.     No Known Allergies    ROS:  12 point review of systems negative other than symptoms noted below or in the HPI.  No urinary frequency or dysuria, bladder or kidney problems      OBJECTIVE:     /78   Ht 1.632 m (5' 4.25\")   Wt 69.9 kg (154 lb)   BMI 26.23 kg/m    Body mass index is 26.23 kg/m .    Exam:  Constitutional:  Appearance: Well nourished, well developed " alert, in no acute distress  Breasts:  Inspection of Breasts:  Symmetric bilaterally.  No puckering.  No skin changes.  Palpation of Breasts and Axillae:  No masses present on palpation, no breast tenderness Axillary Lymph Nodes:  No lymphadenopathy present  Gastrointestinal:  Abdominal Examination:  Abdomen nontender to palpation, tone normal without rigidity or guarding, no masses present, umbilicus without lesions; Liver/Spleen:  No hepatomegaly present, liver nontender to palpation; Hernias:  No hernias present  Lymphatic: Lymph Nodes:  No other lymphadenopathy present  Psychiatric:  Mentation appears normal and affect normal/bright.  Pelvic Exam:  External Genitalia:     Normal appearance for age, no discharge present, no tenderness present, no inflammatory lesions present, color normal  Vagina:     Normal vaginal vault without central or paravaginal defects, no discharge present, no inflammatory lesions present, no masses present  Bladder:     Nontender to palpation  Urethra:   Urethral Body:  Urethra palpation normal, urethra structural support normal   Urethral Meatus:  No erythema or lesions present  Cervix:     Appearance healthy, no lesions present, nontender to palpation, no bleeding present  Uterus:     Uterus: firm, normal sized and nontender, midplane in position.   Adnexa:     No adnexal tenderness present, no adnexal masses present  Perineum:     Perineum within normal limits, no evidence of trauma, no rashes or skin lesions present  Anus:     Anus within normal limits, no hemorrhoids present  Inguinal Lymph Nodes:     No lymphadenopathy present  Pubic Hair:     Normal pubic hair distribution for age  Genitalia and Groin:     No rashes present, no lesions present, no areas of discoloration, no masses present     In-Clinic Test Results:  No results found for this or any previous visit (from the past 24 hours).    ASSESSMENT/PLAN:                                                        ICD-10-CM    1.  Osteopenia, senile  M85.80 DX Bone Density     Orthopedic  Referral      2. Urinary urgency  R39.15 Physical Therapy  Referral      3. Pain of right hip  M25.551 Orthopedic  Referral      4. Encounter for breast and pelvic examination  Z01.419       5. Pap smear of cervix not needed  Z53.8           Patient Instructions   -Daily total calcium intake (between food/supplements) should be 1200mg which equates to 5 servings calcium containing food per day; VItamin D 2000IU.   Foods rich in calcium are: milk, cheese, yogurt, seafood, sardines and canned salmon, leafy green vegetables such as melida greens, spinach and kale, beans and lentils, almonds, seeds (poppy, sesame, celery, heide), rhubarb, dried fruit such as figs, whey protein, tofu and edamame, amaranth, other foods with added calcium such as orange juice and some cereals.   If adequate amount not taken in diet, then a supplement may be needed.     -I also recommend increasing your dietary fiber by starting Metamucil (powder mixed in glass of water) once to twice daily      -No need for further cervical cancer screening.   -Breast self awareness discussed. UTD for mammogram.  -Osteoporosis prevention discussed.  Due for dexa, order placed. Off fosamax for a year.   -Hip pain, recurrent, hx bursitis.  Will refer back to Ortho, has been to [facility] in past.   -Urinary urgency. Chronic.  Discussed tx options of physical therapy, oral meds. Would rec physical therapy as she is not very symptomatic. SE of meds likely not worth the benefit to her  -PMB precuations  -Return one--two years for next women's health exam      (28 minutes was spent on the date of the encounter doing chart review, review and interpretation of pertinent test results, history and/or exam, documentation, patient counseling.)    Kirstin Bower Masters, DO  [facility] FOR [facility]

## 2024-12-09 ENCOUNTER — PATIENT OUTREACH (OUTPATIENT)
Dept: CARE COORDINATION | Facility: CLINIC | Age: 79
End: 2024-12-09
Payer: COMMERCIAL

## 2025-01-03 ENCOUNTER — ANCILLARY PROCEDURE (OUTPATIENT)
Dept: BONE DENSITY | Facility: CLINIC | Age: 80
End: 2025-01-03
Attending: OBSTETRICS & GYNECOLOGY
Payer: COMMERCIAL

## 2025-01-03 DIAGNOSIS — M85.80 OSTEOPENIA, SENILE: ICD-10-CM

## 2025-01-03 PROCEDURE — 77080 DXA BONE DENSITY AXIAL: CPT | Mod: TC | Performed by: PHYSICIAN ASSISTANT

## 2025-01-06 DIAGNOSIS — M81.0 OSTEOPOROSIS, SENILE: Primary | ICD-10-CM

## 2025-02-23 ENCOUNTER — HEALTH MAINTENANCE LETTER (OUTPATIENT)
Age: 80
End: 2025-02-23

## 2025-04-12 ENCOUNTER — HEALTH MAINTENANCE LETTER (OUTPATIENT)
Age: 80
End: 2025-04-12